# Patient Record
Sex: MALE | Race: WHITE
[De-identification: names, ages, dates, MRNs, and addresses within clinical notes are randomized per-mention and may not be internally consistent; named-entity substitution may affect disease eponyms.]

---

## 2019-02-26 ENCOUNTER — HOSPITAL ENCOUNTER (INPATIENT)
Dept: HOSPITAL 74 - YASAS | Age: 59
LOS: 4 days | Discharge: HOME | DRG: 773 | End: 2019-03-02
Attending: SURGERY | Admitting: SURGERY
Payer: COMMERCIAL

## 2019-02-26 VITALS — BODY MASS INDEX: 21.2 KG/M2

## 2019-02-26 DIAGNOSIS — F14.20: ICD-10-CM

## 2019-02-26 DIAGNOSIS — D72.819: ICD-10-CM

## 2019-02-26 DIAGNOSIS — F17.210: ICD-10-CM

## 2019-02-26 DIAGNOSIS — Z91.013: ICD-10-CM

## 2019-02-26 DIAGNOSIS — J45.909: ICD-10-CM

## 2019-02-26 DIAGNOSIS — D64.9: ICD-10-CM

## 2019-02-26 DIAGNOSIS — F11.23: Primary | ICD-10-CM

## 2019-02-26 PROCEDURE — HZ2ZZZZ DETOXIFICATION SERVICES FOR SUBSTANCE ABUSE TREATMENT: ICD-10-PCS | Performed by: NEUROMUSCULOSKELETAL MEDICINE & OMM

## 2019-02-26 RX ADMIN — GUAIFENESIN AND DEXTROMETHORPHAN PRN ML: 100; 10 SYRUP ORAL at 20:28

## 2019-02-26 RX ADMIN — Medication SCH MG: at 22:15

## 2019-02-26 RX ADMIN — ACETAMINOPHEN PRN MG: 325 TABLET ORAL at 20:28

## 2019-02-26 RX ADMIN — IBUPROFEN PRN MG: 400 TABLET, FILM COATED ORAL at 22:15

## 2019-02-26 NOTE — HP
COWS





- Scale


Resting Pulse: 0= ND 80 or Below


Sweatin= Chills/Flushing


Restless Observation: 1= Difficult to Sit Still


Pupil Size: 0= Normal to Room Light


Bone or Joint Aches: 4=Acute Joint/Muscle Pain


Runny Nose/ Eye Tearin= Runny Nose/Eyes


GI Upset > 30mins: 2= Nausea/Diarrhea


Tremor Observation: 0= None


Yawning Observation: 0= None


Anxiety or Irritability: 2=Irritable/Anxious


Goose Flesh Skin: 0=Smooth Skin


COWS Score: 12





CIWA Score





- Admission Criteria


OASAS Guidelines: Admission for Medically Managed Detox: 


Requires at least one of the followin. CIWA greater than 12


2. Seizures within the past 24 hours


3. Delirium tremens within the past 24 hours


4. Hallucinations within the past 24 hours


5. Acute intervention needed for co  occurring medical disorder


6. Acute intervention needed for co  occurring psychiatric disorder


7. Severe withdrawal that cannot be handled at a lower level of care (continued


    vomiting, continued diarrhea, abnormal vital signs) requiring intravenous


    medication and/or fluids


8. Pregnancy








Admission ROS [facility]





- [facility]


Allergies/Adverse Reactions: 


 Allergies











Allergy/AdvReac Type Severity Reaction Status Date / Time


 


seafood Allergy Severe Hives Uncoded 19 12:47


 


NKDA Allergy   Uncoded 19 12:47











History of Present Illness: 





patient here reports heroin use  1.5  bundles/ day x  19  months , prior  to 

which he did not use heroin . Reports IVDU  in [facility]  , needles from the 

exchange, denies  sharing, or re-using , denies abscess, denies OD  . latest 

use yesterday  1  am  , current symptoms as above  . This is the pt's first tx 

episode since relapse .  Reports  he was using  heroin since age 28 , through  

[id number]  , sober  until  2017 states was busy  with work  . 


cocaine  :  100 $/day  IVDU since same time as heroin  


fentanyl : reports  1-2 bags  not daily  


bup - denies 


not in [facility]  


etoh : denies  


occasional cannabis  


denies  other  illicits 


tobacco  : 3 cigs/ day since age 28 . 


PMHX : HIV  dx   ( RF =ST )  , goes to [facility], latest visit  

9 mo ago, admits to non- compliance w. HAART asthma since childhood  ( NH/ NI  

) has Albuterol  inhaler  prn  latest used yesterday  , GERD dx  6 mo ago  per 

pt ,  forgot name of meds, did not bring any , did not followup . 


PSHx : denies 


PSych : denies 


 meds : HIV does not take  


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 


Exam Limitations: No Limitations





- Ebola screening


Have you traveled outside of the country in the last 21 days: No


Have you had contact with anyone from an Ebola affected area: No


Have you been sick,other than usual withdrawal symptoms: No





- Review of Systems


Constitutional: See HPI


EENT: reports: See HPI, Other (reading glasses)


Respiratory: reports: Cough (x 3 weeks , dry,  non- productive)


: reports: See HPI


Musculoskeletal: reports: See HPI


Integumentary: reports: See HPI


Neuro: reports: No Symptoms reported


Endocrine: reports: No Symptoms Reported


Psychiatric: reports: Orientated x3, Agitated, Anxious





Patient History





- Smoking Cessation


Smoking history: Current every day smoker


Have you smoked in the past 12 months: Yes


Aproximately how many cigarettes per day: 3


Hx Chewing Tobacco Use: No


Initiated information on smoking cessation: No





- Substances Abused


  ** Heroin


Route: Injection


Frequency: Daily


Amount used: 15 bags


Age of first use: 28


Date of Last Use: 19





  ** Cocaine


Route: Injection


Frequency: Daily


Amount used: $100


Age of first use: 28


Date of Last Use: 19





Family Disease History





- Family Disease History


Family History: Denies





Admission Physical Exam BHS





- Vital Signs


Vital Signs: 


 Vital Signs - 24 hr











  19





  11:58


 


Temperature 98.1 F


 


Pulse Rate 79


 


Respiratory 17





Rate 


 


Blood Pressure 98/61














- Physical


General Appearance: Yes: Disheveled, Moderate Distress, Thin, Sweating, Anxious


HEENTM: Yes: EOMI, Normocephalic, Normal Voice, Nasal Congestion, Rhinorrhea, 

Muffled/Hoarse Voice


Respiratory: Yes: Chest Non-Tender, Lungs Clear, Decreased Breath Sounds, No 

Accessory Muscle Use


Neck: Yes: No masses,lesions,Nodules, Trachea in good position


Cardiology: Yes: Regular Rhythm, Regular Rate, S1, S2


Abdominal: Yes: Normal Bowel Sounds, Non Tender, Flat, Soft


Genitourinary: Yes: Within Normal Limits


Back: Yes: Normal Inspection


Musculoskeletal: Yes: full range of Motion, Gait Steady


Extremities: Yes: Normal Capillary Refill, Normal Range of Motion, Non-Tender


Neurological: Yes: Fully Oriented, Alert, Motor Strength 5/5


Integumentary: Yes: Track Marks, Other (marks [facility] / LE  from skin- popping)





- Diagnostic


(1) Opioid dependence


Current Visit: Yes   Status: Acute   


Qualifiers: 


   Substance use status: in withdrawal   Qualified Code(s): F11.23 - Opioid 

dependence with withdrawal   





(2) Cocaine dependence


Current Visit: Yes   Status: Chronic   


Qualifiers: 


   Substance use status: uncomplicated   Qualified Code(s): F14.20 - Cocaine 

dependence, uncomplicated   





(3) Nicotine dependence


Current Visit: Yes   Status: Chronic   


Qualifiers: 


   Nicotine product type: cigarettes 





(4) Asthma


Current Visit: Yes   Status: Chronic   


Qualifiers: 


   Asthma severity: unspecified severity 





BHS Breath Alcohol Content


Breath Alcohol Content: 0





Urine Drug Screen





- Results


Drug Screen Negative: No


Urine Drug Screen Results: BENJAMIN-Cocaine, OPI-Opiates, FEN-Fentanyl, BUP-Suboxone





Inpatient Rehab Admission





- Rehab Decision to Admit


Inpatient rehab admission?: No

## 2019-02-27 LAB
ALBUMIN SERPL-MCNC: 2.8 G/DL (ref 3.4–5)
ALP SERPL-CCNC: 104 U/L (ref 45–117)
ALT SERPL-CCNC: 84 U/L (ref 13–61)
ANION GAP SERPL CALC-SCNC: 5 MMOL/L (ref 8–16)
AST SERPL-CCNC: 73 U/L (ref 15–37)
BILIRUB SERPL-MCNC: 0.3 MG/DL (ref 0.2–1)
BUN SERPL-MCNC: 11 MG/DL (ref 7–18)
CALCIUM SERPL-MCNC: 7.5 MG/DL (ref 8.5–10.1)
CHLORIDE SERPL-SCNC: 101 MMOL/L (ref 98–107)
CO2 SERPL-SCNC: 29 MMOL/L (ref 21–32)
CREAT SERPL-MCNC: 0.7 MG/DL (ref 0.55–1.3)
DEPRECATED RDW RBC AUTO: 16.4 % (ref 11.9–15.9)
GLUCOSE SERPL-MCNC: 118 MG/DL (ref 74–106)
HCT VFR BLD CALC: 32.2 % (ref 35.4–49)
HGB BLD-MCNC: 10.8 GM/DL (ref 11.7–16.9)
MCH RBC QN AUTO: 28.9 PG (ref 25.7–33.7)
MCHC RBC AUTO-ENTMCNC: 33.7 G/DL (ref 32–35.9)
MCV RBC: 85.9 FL (ref 80–96)
PLATELET # BLD AUTO: 157 K/MM3 (ref 134–434)
PMV BLD: 11.1 FL (ref 7.5–11.1)
POTASSIUM SERPLBLD-SCNC: 4.3 MMOL/L (ref 3.5–5.1)
PROT SERPL-MCNC: 7.8 G/DL (ref 6.4–8.2)
RBC # BLD AUTO: 3.74 M/MM3 (ref 4–5.6)
SODIUM SERPL-SCNC: 134 MMOL/L (ref 136–145)
WBC # BLD AUTO: 5.9 K/MM3 (ref 4–10)

## 2019-02-27 RX ADMIN — ALBUTEROL SULFATE PRN AMP: 2.5 SOLUTION RESPIRATORY (INHALATION) at 21:34

## 2019-02-27 RX ADMIN — Medication PRN MG: at 11:52

## 2019-02-27 RX ADMIN — Medication SCH TAB: at 10:08

## 2019-02-27 RX ADMIN — Medication SCH MG: at 22:21

## 2019-02-27 RX ADMIN — ACETAMINOPHEN PRN MG: 325 TABLET ORAL at 22:20

## 2019-02-27 RX ADMIN — GUAIFENESIN AND DEXTROMETHORPHAN PRN ML: 100; 10 SYRUP ORAL at 11:52

## 2019-02-27 RX ADMIN — ALBUTEROL SULFATE PRN AMP: 2.5 SOLUTION RESPIRATORY (INHALATION) at 14:54

## 2019-02-27 NOTE — PN
BHS COWS





- Scale


Resting Pulse: 0= [place] 80 or Below


Sweatin= Chills/Flushing


Restless Observation: 0= Sits Still


Pupil Size: 0= Normal to Room Light


Bone or Joint Aches: 2= Severe Diffuse Aches


Runny Nose/ Eye Tearin= None


GI Upset > 30mins: 2= Nausea/Diarrhea


Tremor Observation of Outstretched Hands: 2= Slight Tremor Visible


Yawning Observation: 1= 1-2x During Session


Anxiety or Irritability: 2=Irritable/Anxious


Goose Flesh Skin: 3=Piloerection


COWS Score: 13





BHS Progress Note (SOAP)


Subjective: 





Interrupted Sleep, Tremors, Nausea, H/A, Hot / Cold Sensations, Sweating, Poor 

Appetite, Tremors, Constipation.


Objective: 


PATIENT A & O X 3, OBSERVED AMBULATING ON UNIT. IN NO ACUTE DISTRESS.





19 16:38


 Vital Signs











Temperature  98.6 F   19 13:12


 


Pulse Rate  75   19 13:12


 


Respiratory Rate  18   19 13:12


 


Blood Pressure  114/64   19 13:12


 


O2 Sat by Pulse Oximetry (%)      








 Laboratory Tests











  19





  05:45 05:45 05:45


 


WBC  5.9  


 


RBC  3.74 L  


 


Hgb  10.8 L  


 


Hct  32.2 L  


 


MCV  85.9  


 


MCH  28.9  


 


MCHC  33.7  


 


RDW  16.4 H  


 


Plt Count  157  


 


MPV  11.1  


 


Sodium   134 L 


 


Potassium   4.3 


 


Chloride   101 


 


Carbon Dioxide   29 


 


Anion Gap   5 L 


 


BUN   11 


 


Creatinine   0.7 


 


Creat Clearance w eGFR   > 60 


 


Random Glucose   118 H 


 


Calcium   7.5 L 


 


Total Bilirubin   0.3 


 


AST   73 H 


 


ALT   84 H 


 


Alkaline Phosphatase   104 


 


Total Protein   7.8 


 


Albumin   2.8 L 


 


RPR Titer    Nonreactive








LABS NOTED.


Assessment: 





19 16:38


WITHDRAWAL SYMPTOMS.


ANEMIA.





19 16:39





Plan: 





CONTINUE DETOX.


INCREASE DAILY PO FLUID INTAKE.


TIGAN PO P[RN FOR NAUSEA.


ENSURE, BID FOR POOR APPETITE.


PATIENT IS CURRENTLY RECEIVING DAILY MVI CONTAINING B VITAMINS AND IRON WHILE 

ADMITTED FOR DETOX.


REPEAT CBC TOMORROW AM.

## 2019-02-28 LAB
ANISOCYTOSIS BLD QL: (no result)
BASOPHILS # BLD: 0.7 % (ref 0–2)
DEPRECATED RDW RBC AUTO: 16.6 % (ref 11.9–15.9)
EOSINOPHIL # BLD: 6 % (ref 0–4.5)
HCT VFR BLD CALC: 33.9 % (ref 35.4–49)
HGB BLD-MCNC: 11.3 GM/DL (ref 11.7–16.9)
LYMPHOCYTES # BLD: 16 % (ref 8–40)
MACROCYTES BLD QL: (no result)
MCH RBC QN AUTO: 28.6 PG (ref 25.7–33.7)
MCHC RBC AUTO-ENTMCNC: 33.2 G/DL (ref 32–35.9)
MCV RBC: 86.2 FL (ref 80–96)
MONOCYTES # BLD AUTO: 16.7 % (ref 3.8–10.2)
NEUTROPHILS # BLD: 60.6 % (ref 42.8–82.8)
OVALOCYTES BLD QL SMEAR: (no result)
PLATELET # BLD AUTO: 181 K/MM3 (ref 134–434)
PLATELET BLD QL SMEAR: NORMAL
PMV BLD: 10.3 FL (ref 7.5–11.1)
RBC # BLD AUTO: 3.94 M/MM3 (ref 4–5.6)
WBC # BLD AUTO: 3.7 K/MM3 (ref 4–10)

## 2019-02-28 RX ADMIN — Medication SCH MG: at 22:32

## 2019-02-28 RX ADMIN — ALBUTEROL SULFATE PRN INHALER: 90 AEROSOL, METERED RESPIRATORY (INHALATION) at 10:52

## 2019-02-28 RX ADMIN — NICOTINE SCH MG: 14 PATCH, EXTENDED RELEASE TRANSDERMAL at 11:29

## 2019-02-28 RX ADMIN — Medication PRN MG: at 10:50

## 2019-02-28 RX ADMIN — GUAIFENESIN AND DEXTROMETHORPHAN PRN ML: 100; 10 SYRUP ORAL at 19:14

## 2019-02-28 RX ADMIN — Medication SCH TAB: at 10:50

## 2019-02-28 RX ADMIN — ALBUTEROL SULFATE PRN INHALER: 90 AEROSOL, METERED RESPIRATORY (INHALATION) at 22:32

## 2019-02-28 NOTE — PN
BHS COWS





- Scale


Resting Pulse: 1= RI 


Sweatin= Chills/Flushing


Restless Observation: 1= Difficult to Sit Still


Pupil Size: 0= Normal to Room Light


Bone or Joint Aches: 2= Severe Diffuse Aches


Runny Nose/ Eye Tearin= Nasal Congestion


GI Upset > 30mins: 0= None


Tremor Observation of Outstretched Hands: 2= Slight Tremor Visible


Yawning Observation: 1= 1-2x During Session


Anxiety or Irritability: 2=Irritable/Anxious


Goose Flesh Skin: 0=Smooth Skin


COWS Score: 11





BHS Progress Note (SOAP)


Subjective: 





Interrupted Sleep, Tremors, Nausea, Fatigue, Sweating, Poor Appetite, Tremors, 

Constipation.


Objective: 


PATIENT A & O X 3, OBSERVED AMBULATING ON UNIT. IN NO ACUTE DISTRESS.


PATIENT REPORTS INTERMITTENT DYSPNEA. PATIENT HAS HISTORY OF ASTHMA.


LUNG SOUNDS AUSCULTATED CLEAR AND EQUAL BILATERALLY.





19 15:10


 Vital Signs











Temperature  97.4 F L  19 13:14


 


Pulse Rate  83   19 13:14


 


Respiratory Rate  18   19 13:14


 


Blood Pressure  108/70   19 13:14


 


O2 Sat by Pulse Oximetry (%)      








 Laboratory Tests











  19





  05:45 05:45 05:45


 


WBC  5.9  


 


RBC  3.74 L  


 


Hgb  10.8 L  


 


Hct  32.2 L  


 


MCV  85.9  


 


MCH  28.9  


 


MCHC  33.7  


 


RDW  16.4 H  


 


Plt Count  157  


 


MPV  11.1  


 


Absolute Neuts (auto)   


 


Neutrophils %   


 


Neutrophils % (Manual)   


 


Band Neutrophils %   


 


Lymphocytes %   


 


Lymphocytes % (Manual)   


 


Monocytes %   


 


Monocytes % (Manual)   


 


Eosinophils %   


 


Eosinophils % (Manual)   


 


Basophils %   


 


Basophils % (Manual)   


 


Myelocytes % (Man)   


 


Promyelocytes % (Man)   


 


Blast Cells % (Manual)   


 


Nucleated RBC %   


 


Metamyelocytes   


 


Hypochromia   


 


Platelet Estimate   


 


Polychromasia   


 


Poikilocytosis   


 


Anisocytosis   


 


Microcytosis   


 


Macrocytosis   


 


Ovalocytes   


 


Schistocytes   


 


Sodium   134 L 


 


Potassium   4.3 


 


Chloride   101 


 


Carbon Dioxide   29 


 


Anion Gap   5 L 


 


BUN   11 


 


Creatinine   0.7 


 


Creat Clearance w eGFR   > 60 


 


Random Glucose   118 H 


 


Calcium   7.5 L 


 


Total Bilirubin   0.3 


 


AST   73 H 


 


ALT   84 H 


 


Alkaline Phosphatase   104 


 


Total Protein   7.8 


 


Albumin   2.8 L 


 


RPR Titer    Nonreactive














  19





  07:00


 


WBC  3.7 L


 


RBC  3.94 L


 


Hgb  11.3 L


 


Hct  33.9 L


 


MCV  86.2


 


MCH  28.6


 


MCHC  33.2


 


RDW  16.6 H


 


Plt Count  181


 


MPV  10.3


 


Absolute Neuts (auto)  2.2


 


Neutrophils %  60.6


 


Neutrophils % (Manual)  67.4


 


Band Neutrophils %  1.0


 


Lymphocytes %  16.0


 


Lymphocytes % (Manual)  14.7


 


Monocytes %  16.7 H


 


Monocytes % (Manual)  4


 


Eosinophils %  6.0 H


 


Eosinophils % (Manual)  11.6 H


 


Basophils %  0.7


 


Basophils % (Manual)  0.0


 


Myelocytes % (Man)  0


 


Promyelocytes % (Man)  0


 


Blast Cells % (Manual)  0


 


Nucleated RBC %  0


 


Metamyelocytes  0


 


Hypochromia  0


 


Platelet Estimate  Normal


 


Polychromasia  0


 


Poikilocytosis  0


 


Anisocytosis  1+


 


Microcytosis  1+


 


Macrocytosis  1+


 


Ovalocytes  1+


 


Schistocytes  1+


 


Sodium 


 


Potassium 


 


Chloride 


 


Carbon Dioxide 


 


Anion Gap 


 


BUN 


 


Creatinine 


 


Creat Clearance w eGFR 


 


Random Glucose 


 


Calcium 


 


Total Bilirubin 


 


AST 


 


ALT 


 


Alkaline Phosphatase 


 


Total Protein 


 


Albumin 


 


RPR Titer 








LABS NOTED.


RESULTS OF REPEAT CBC NOTED.





19 15:12





Assessment: 





19 15:12


WITHDRAWAL SYMPTOMS.


ANEMIA.


LEUKOPENIA.


Plan: 





CONTINUE DETOX.


INCREASE DAILY PO FLUID INTAKE.


PRN VENTOLIN INHALER / PRN NEBULIZER TREATMENTS FOR SOB.


PRN TIGAN PO FOR NAUSEA.


PATIENT IS CURRENTLY RECEIVING DAILY MVI CONTAINING B VITAMINS AND IRON WHILE 

ADMITTED FOR DETOX.


WILL REPEAT CBC ON 2019 TO SEE IF ANY FURTHER CHANGE OCCURS.

## 2019-03-01 RX ADMIN — GUAIFENESIN AND DEXTROMETHORPHAN PRN ML: 100; 10 SYRUP ORAL at 05:44

## 2019-03-01 RX ADMIN — Medication SCH MG: at 22:33

## 2019-03-01 RX ADMIN — Medication SCH TAB: at 10:21

## 2019-03-01 RX ADMIN — IBUPROFEN PRN MG: 400 TABLET, FILM COATED ORAL at 07:29

## 2019-03-01 RX ADMIN — GUAIFENESIN AND DEXTROMETHORPHAN PRN ML: 100; 10 SYRUP ORAL at 22:33

## 2019-03-01 RX ADMIN — NICOTINE SCH MG: 14 PATCH, EXTENDED RELEASE TRANSDERMAL at 10:21

## 2019-03-01 NOTE — PN
BHS Progress Note (SOAP)


Subjective: 





alert,irritable,anxious,interrupted sleep,pain in the body and back


Objective: 





03/01/19 09:52


 Vital Signs











Temperature  98.2 F   03/01/19 09:23


 


Pulse Rate  85   03/01/19 09:23


 


Respiratory Rate  20   03/01/19 09:23


 


Blood Pressure  119/57 L  03/01/19 09:23


 


O2 Sat by Pulse Oximetry (%)      











Assessment: 





03/01/19 09:52


withdrawal symptom


Plan: 





continue detox

## 2019-03-02 VITALS — SYSTOLIC BLOOD PRESSURE: 103 MMHG | HEART RATE: 92 BPM | DIASTOLIC BLOOD PRESSURE: 67 MMHG | TEMPERATURE: 99 F

## 2019-03-02 LAB
ANISOCYTOSIS BLD QL: (no result)
BASOPHILS # BLD: 1.5 % (ref 0–2)
DEPRECATED RDW RBC AUTO: 16.3 % (ref 11.9–15.9)
EOSINOPHIL # BLD: 5.2 % (ref 0–4.5)
HCT VFR BLD CALC: 31.2 % (ref 35.4–49)
HGB BLD-MCNC: 10.1 GM/DL (ref 11.7–16.9)
LYMPHOCYTES # BLD: 11.1 % (ref 8–40)
MACROCYTES BLD QL: (no result)
MCH RBC QN AUTO: 27.7 PG (ref 25.7–33.7)
MCHC RBC AUTO-ENTMCNC: 32.4 G/DL (ref 32–35.9)
MCV RBC: 85.5 FL (ref 80–96)
MONOCYTES # BLD AUTO: 15 % (ref 3.8–10.2)
NEUTROPHILS # BLD: 67.2 % (ref 42.8–82.8)
PLATELET # BLD AUTO: 211 K/MM3 (ref 134–434)
PLATELET BLD QL SMEAR: NORMAL
PMV BLD: 10 FL (ref 7.5–11.1)
RBC # BLD AUTO: 3.65 M/MM3 (ref 4–5.6)
WBC # BLD AUTO: 4.7 K/MM3 (ref 4–10)

## 2019-03-02 RX ADMIN — NICOTINE SCH MG: 14 PATCH, EXTENDED RELEASE TRANSDERMAL at 10:33

## 2019-03-02 RX ADMIN — Medication SCH TAB: at 10:34

## 2019-03-02 NOTE — DS
BHS Detox Discharge Summary


Admission Date: 


02/26/19





Discharge Date: 03/02/19





- History


Additional Comments: 





Pt for d/c


Had last dose of detox regimen today


Not in distress


Albuterol and Narcan prescriptions sent to [place] pharmacy, pt verbalized 

understanding to pick  up prior to leaving.


States he is strong and will not relapse as he had been clean for 27 yrs and 

that this was just a slip.


Pt states he will do outpt rehab upon discharge as he is going back to work





- Physical Exam Results


Vital Signs: 


 Vital Signs











Temperature  99 F   03/02/19 09:28


 


Pulse Rate  92 H  03/02/19 09:28


 


Respiratory Rate  18   03/02/19 09:28


 


Blood Pressure  103/67   03/02/19 09:28


 


O2 Sat by Pulse Oximetry (%)      











Pertinent Admission Physical Exam Findings: 





withdrawal sx





- Treatment


Hospital Course: Detox Protocol Followed, Detoxed Safely, Responded well, 

Discharged Condition Good





- Medication


Discharge Medications: 


Ambulatory Orders





Albuterol Sulfate Inhaler - [Ventolin HFA Inhaler -] 2 inh PO Q4H PRN #1 

inhaler 03/02/19 


Naloxone HCl [Narcan] 4 mg NS PRN #1 spray 03/02/19 











- AMA


Did Patient Leave Against Medical Advice: No

## 2019-03-02 NOTE — PN
BHS Progress Note (SOAP)


Subjective: 





Denies any complaints


Requesting D/c


Objective: 





03/02/19 10:50


A & O x 3


gait steady


Not   in  distress








 Vital Signs











Temperature  99 F   03/02/19 09:28


 


Pulse Rate  92 H  03/02/19 09:28


 


Respiratory Rate  18   03/02/19 09:28


 


Blood Pressure  103/67   03/02/19 09:28


 


O2 Sat by Pulse Oximetry (%)      

















Assessment: 





03/02/19 10:50


detox completed


Plan: 





For d/c

## 2019-03-25 ENCOUNTER — HOSPITAL ENCOUNTER (INPATIENT)
Dept: HOSPITAL 74 - YASAS | Age: 59
LOS: 4 days | Discharge: HOME | DRG: 773 | End: 2019-03-29
Attending: SURGERY | Admitting: SURGERY
Payer: COMMERCIAL

## 2019-03-25 VITALS — BODY MASS INDEX: 19.8 KG/M2

## 2019-03-25 DIAGNOSIS — E83.51: ICD-10-CM

## 2019-03-25 DIAGNOSIS — F14.20: ICD-10-CM

## 2019-03-25 DIAGNOSIS — Z21: ICD-10-CM

## 2019-03-25 DIAGNOSIS — F11.23: Primary | ICD-10-CM

## 2019-03-25 DIAGNOSIS — F10.230: ICD-10-CM

## 2019-03-25 DIAGNOSIS — F13.230: ICD-10-CM

## 2019-03-25 DIAGNOSIS — Z91.013: ICD-10-CM

## 2019-03-25 DIAGNOSIS — F41.9: ICD-10-CM

## 2019-03-25 DIAGNOSIS — F17.210: ICD-10-CM

## 2019-03-25 DIAGNOSIS — Z87.09: ICD-10-CM

## 2019-03-25 DIAGNOSIS — Z99.89: ICD-10-CM

## 2019-03-25 DIAGNOSIS — R26.89: ICD-10-CM

## 2019-03-25 PROCEDURE — HZ2ZZZZ DETOXIFICATION SERVICES FOR SUBSTANCE ABUSE TREATMENT: ICD-10-PCS | Performed by: SURGERY

## 2019-03-25 RX ADMIN — Medication SCH MG: at 22:31

## 2019-03-25 RX ADMIN — Medication PRN MG: at 22:30

## 2019-03-25 NOTE — HP
COWS





- Scale


Resting Pulse: 0= [place] 80 or Below


Sweatin= Chills/Flushing


Restless Observation: 1= Difficult to Sit Still


Pupil Size: 1= Pupils >than Normal


Bone or Joint Aches: 1= Mild Discomfort


Runny Nose/ Eye Tearin= Runny Nose/Eyes


GI Upset > 30mins: 2= Nausea/Diarrhea


Tremor Observation: 1= Tremor Felt, Not Seen


Yawning Observation: 1= 1-2x During Session


Anxiety or Irritability: 2=Irritable/Anxious


Goose Flesh Skin: 0=Smooth Skin


COWS Score: 12





CIWA Score


Nausea/Vomiting: 3


Muscle Tremors: 2


Anxiety: 3


Agitation: 0-Normal Activity


Paroxysmal Sweats: 2


Orientation: 1-Uncertain about Date


Tacttile Disturbances: 0-None


Auditory Disturbances: 0-None


Visual Disturbances: 2-Mild Sensitivity


Headache: 0-None Present


CIWA-Ar Total Score: 13





- Admission Criteria


OASAS Guidelines: Admission for Medically Managed Detox: 


Requires at least one of the followin. CIWA greater than 12


2. Seizures within the past 24 hours


3. Delirium tremens within the past 24 hours


4. Hallucinations within the past 24 hours


5. Acute intervention needed for co  occurring medical disorder


6. Acute intervention needed for co  occurring psychiatric disorder


7. Severe withdrawal that cannot be handled at a lower level of care (continued


    vomiting, continued diarrhea, abnormal vital signs) requiring intravenous


    medication and/or fluids


8. Pregnancy





Patient presents the following: CIWA greater than 12, Acute intervention needed 

for co-occurring med or psych disorder


Admission Criteria Met: Admission criteria met





Admission ROS [facility]


Chief Complaint: 





" I want to get clean, I am tire of this"


Allergies/Adverse Reactions: 


 Allergies











Allergy/AdvReac Type Severity Reaction Status Date / Time


 


No Known Drug Allergies Allergy   Verified 19 16:06


 


seafood Allergy Severe Hives Uncoded 19 12:47


 


NKDA Allergy   Uncoded 19 12:47











History of Present Illness: 





Patient is a 57 yo male with heroin (IV), cocaine (IV), alcohol, xanax 

dependence is here seeking detox. Patient reports occasional use of street 

suboxone to prevent withdrawal. Patient reports he want to [facility] for detox 

on 3/18/19 and on that same day was sent to the ED for suspected pneumonia and 

was discharge prior to completing detox treatment. PMHX: HIV, asthma and 

anxiety.  Patient reports poor compliance with medical care and medications d/t 

substance use disorder. Denies suicidal/ homicidal ideation. Denies hx of 

suicide attempt. Denies hx of overdose, blackouts or seizures. Longest period 

of sobriety 15 years, reports relapse eight months ago.


Exam Limitations: No Limitations





- Ebola screening


Have you traveled outside of the country in the last 21 days: No


Have you had contact with anyone from an Ebola affected area: No


Have you been sick,other than usual withdrawal symptoms: No





- Review of Systems


Constitutional: Chills, Loss of Appetite, Changes in sleep, Weakness, 

Unintentional Wgt. Loss


EENT: reports: Tearing, Nose Congestion


Respiratory: reports: No Symptoms reported


Cardiac: reports: Lightheadedness


GI: reports: Constipated (last BM yesterday), Nausea, Poor Appetite, Poor Fluid 

Intake, Abdominal cramping


: reports: No Symptoms Reported


Musculoskeletal: reports: Back Pain, Joint Pain


Integumentary: reports: Dryness


Neuro: reports: Dizziness


Endocrine: reports: Increased Thirst


Hematology: reports: No Symptoms Reported


Psychiatric: reports: Orientated x3, Anxious


Other Systems: Reviewed and Negative





Patient History





- Patient Medical History


Hx Anemia: No


Hx Asthma: Yes


Hx Chronic Obstructive Pulmonary Disease (COPD): No


Hx Cancer: No


Hx Cardiac Disorders: No


Hx Congestive Heart Failure: No


Hx Hypertension: No


Hx Hypercholesterolemia: No


Hx Pacemaker: No


HX Cerebrovascular Accident: No


Hx Seizures: No


Hx Dementia: No


Hx Diabetes: No


Hx Gastrointestinal Disorders: No


Hx Liver Disease: Yes (Hep C and treated six months ago )


Hx Genitourinary Disorders: No


Hx Sexually Transmitted Disorders: No


Hx Renal Disease (ESRD): No


Hx Thyroid Disease: No


Hx Human Immunodeficiency Virus (HIV): Yes ( (no meds))


Hx Hepatitis C: Yes (treated six months ago )


Hx Depression: No


Hx Suicide Attempt: No


Hx Bipolar Disorder: No


Hx Schizophrenia: No





- Patient Surgical History


Past Surgical History: No


Hx Neurologic Surgery: No


Hx Cataract Extraction: No


Hx Cardiac Surgery: No


Hx Lung Surgery: No


Hx Breast Surgery: No


Hx Breast Biopsy: No


Hx Abdominal Surgery: No


Hx Appendectomy: No


Hx Cholecystectomy: No


Hx Genitourinary Surgery: No


Hx  Section: No


Hx Orthopedic Surgery: No


Anesthesia Reaction: No





- PPD History


Previous Implant?: No


Documented Results: Negative w/o proof


PPD to be Administered?: Yes





- Smoking Cessation


Smoking history: Current every day smoker


Have you smoked in the past 12 months: Yes


Aproximately how many cigarettes per day: 5


Hx Chewing Tobacco Use: No


Initiated information on smoking cessation: Yes


'Breaking Loose' booklet given: 19





- Substance & Tx. History


Hx Alcohol Use: Yes


Hx Substance Use: Yes


Substance Use Type: Alcohol, Cocaine, Heroin, Opiates, Tranquilizers


Hx Substance Use Treatment: Yes ([facility] 3/18/19 Did not complete )





- Substances Abused


  ** cocaine


Route: Injection


Frequency: Daily


Amount used: 5 - 6 bags 


Age of first use: 27


Date of Last Use: 19





  ** heroin


Route: Injection


Frequency: Daily


Amount used: 5 - 6


Age of first use: 27


Date of Last Use: 19





  ** alcohol


Route: Oral


Frequency: 3-6 times per week


Amount used: 1 1/2 pint 


Age of first use: 18


Date of Last Use: 19





  ** Alprazolam (Xanax)


Route: Oral


Frequency: Daily


Amount used: 3 mg 


Age of first use: 58


Date of Last Use: 19





  ** Buprenorphine


Route: Injection


Frequency: 1-3 times last 30 days


Amount used: 2 mg


Age of first use: 58


Date of Last Use: 19





  ** Street Buprenorphine


Route: Injection


Frequency: 1-3 times last 30 days


Amount used: 2 mg


Age of first use: 58


Date of Last Use: 19





Family Disease History





- Family Disease History


Family History: Denies





Admission Physical Exam BHS





- Vital Signs


Vital Signs: 


 Vital Signs - 24 hr











  19





  15:13


 


Temperature 98.6 F


 


Pulse Rate 79


 


Respiratory 18





Rate 


 


Blood Pressure 95/64














- Physical


General Appearance: Yes: Disheveled, Mild Distress, Cachetic, Sweating, Anxious


HEENTM: Yes: EOMI, Hearing grossly Normal, Normal ENT Inspection, Normocephalic

, Normal Voice, JELANI, Pharynx Normal, Tm's normal, Other (cheilitis, poor 

dentition)


Respiratory: Yes: Chest Non-Tender, Lungs Clear, Normal Breath Sounds, No 

Respiratory Distress, No Accessory Muscle Use


Neck: Yes: Within Normal Limits


Breast: Yes: Breast Exam Deferred


Cardiology: Yes: Regular Rhythm, Regular Rate


Abdominal: Yes: Normal Bowel Sounds, Non Tender, Flat, Soft


Genitourinary: Yes: Within Normal Limits


Back: Yes: Normal Inspection


Musculoskeletal: Yes: full range of Motion, Gait Steady, Pelvis Stable


Extremities: Yes: Normal Capillary Refill, Normal Inspection, Normal Range of 

Motion, Non-Tender


Neurological: Yes: CNs II-XII NML intact, Fully Oriented, Alert, Motor Strength 

5/5, Depressed Affect


Integumentary: Yes: Normal Color, Warm, Diaphoresis, Track Marks (bilateral 

forearms, no infection present)


Lymphatic: Yes: Within Normal Limits





- Diagnostic


(1) Human immunodeficiency virus (HIV) disease


Current Visit: Yes   Status: Acute   





(2) Alcohol dependence with withdrawal


Current Visit: Yes   Status: Acute   





(3) Sedative, hypnotic or anxiolytic dependence with withdrawal, uncomplicated


Current Visit: Yes   Status: Acute   





(4) IVDU (intravenous drug user)


Current Visit: Yes   Status: Acute   





(5) Opioid dependence with withdrawal


Current Visit: Yes   Status: Acute   





(6) Use of cane as ambulatory aid


Current Visit: Yes   Status: Chronic   





(7) Asthma


Current Visit: Yes   Status: Chronic   


Qualifiers: 


   Asthma severity: unspecified severity 





(8) Cocaine dependence


Current Visit: Yes   Status: Chronic   


Qualifiers: 


   Substance use status: uncomplicated   Qualified Code(s): F14.20 - Cocaine 

dependence, uncomplicated   





(9) Nicotine dependence


Current Visit: Yes   Status: Chronic   


Qualifiers: 


   Nicotine product type: cigarettes 





Cleared for Admission [facility]





- Detox or Rehab


[facility] Level of Care: Medically Managed


Detox Regimen/Protocol: Methadone/Librium





BHS Breath Alcohol Content


Breath Alcohol Content: 0.010





Urine Drug Screen





- Results


Drug Screen Negative: No


Urine Drug Screen Results: BENJAMIN-Cocaine, OPI-Opiates, BZO-Benzodiazepines, MTD-

Methadone, FEN-Fentanyl, BUP-Suboxone





Inpatient Rehab Admission





- Rehab Decision to Admit


Inpatient rehab admission?: No

## 2019-03-26 LAB
ALBUMIN SERPL-MCNC: 2.5 G/DL (ref 3.4–5)
ALP SERPL-CCNC: 104 U/L (ref 45–117)
ALT SERPL-CCNC: 46 U/L (ref 13–61)
ANION GAP SERPL CALC-SCNC: 6 MMOL/L (ref 8–16)
AST SERPL-CCNC: 49 U/L (ref 15–37)
BILIRUB SERPL-MCNC: 0.2 MG/DL (ref 0.2–1)
BUN SERPL-MCNC: 11 MG/DL (ref 7–18)
CALCIUM SERPL-MCNC: 7.7 MG/DL (ref 8.5–10.1)
CHLORIDE SERPL-SCNC: 101 MMOL/L (ref 98–107)
CO2 SERPL-SCNC: 29 MMOL/L (ref 21–32)
CREAT SERPL-MCNC: 0.7 MG/DL (ref 0.55–1.3)
DEPRECATED RDW RBC AUTO: 16.5 % (ref 11.9–15.9)
GLUCOSE SERPL-MCNC: 72 MG/DL (ref 74–106)
HCT VFR BLD CALC: 30.2 % (ref 35.4–49)
HGB BLD-MCNC: 9.9 GM/DL (ref 11.7–16.9)
MCH RBC QN AUTO: 27.4 PG (ref 25.7–33.7)
MCHC RBC AUTO-ENTMCNC: 32.8 G/DL (ref 32–35.9)
MCV RBC: 83.5 FL (ref 80–96)
PLATELET # BLD AUTO: 188 K/MM3 (ref 134–434)
PMV BLD: 10.7 FL (ref 7.5–11.1)
POTASSIUM SERPLBLD-SCNC: 4.4 MMOL/L (ref 3.5–5.1)
PROT SERPL-MCNC: 7.3 G/DL (ref 6.4–8.2)
RBC # BLD AUTO: 3.62 M/MM3 (ref 4–5.6)
SODIUM SERPL-SCNC: 136 MMOL/L (ref 136–145)
WBC # BLD AUTO: 4.6 K/MM3 (ref 4–10)

## 2019-03-26 RX ADMIN — Medication SCH MG: at 22:04

## 2019-03-26 RX ADMIN — Medication SCH TAB: at 10:04

## 2019-03-26 RX ADMIN — NICOTINE SCH MG: 14 PATCH, EXTENDED RELEASE TRANSDERMAL at 10:04

## 2019-03-26 RX ADMIN — Medication PRN MG: at 22:04

## 2019-03-26 RX ADMIN — ACETAMINOPHEN PRN MG: 325 TABLET ORAL at 16:35

## 2019-03-26 NOTE — EKG
Test Reason : 

Blood Pressure : ***/*** mmHG

Vent. Rate : 075 BPM     Atrial Rate : 075 BPM

   P-R Int : 144 ms          QRS Dur : 082 ms

    QT Int : 378 ms       P-R-T Axes : 071 047 071 degrees

   QTc Int : 422 ms

 

NORMAL SINUS RHYTHM

NORMAL ECG

NO PREVIOUS ECGS AVAILABLE

Confirmed by JEROMY GARCÍA MD ([id number]) on 3/26/2019 9:29:07 AM

 

Referred By:             Confirmed By:JEROMY GARCÍA MD

## 2019-03-26 NOTE — PN
[facility] CIWA





- CIWA Score


Nausea/Vomitin-No Nausea/No Vomiting


Muscle Tremors: 2


Anxiety: 1-Mildly Anxious


Agitation: 2


Paroxysmal Sweats: 1-Minimal Palms Moist


Orientation: 2-Disoriented Date<2 days


Tacttile Disturbances: 0-None


Auditory Disturbances: 0-None


Visual Disturbances: 0-None


Headache: 0-None Present


CIWA-Ar Total Score: 8





S COWS





- Scale


Resting Pulse: 0= [place] 80 or Below


Sweatin= Chills/Flushing


Restless Observation: 0= Sits Still


Pupil Size: 0= Normal to Room Light


Bone or Joint Aches: 1= Mild Discomfort


Runny Nose/ Eye Tearin= Nasal Congestion


GI Upset > 30mins: 1= Stomach Cramp


Tremor Observation of Outstretched Hands: 1= Tremor Felt, Not Seen


Yawning Observation: 1= 1-2x During Session


Anxiety or Irritability: 1=Feels Anxious/Irritable


Goose Flesh Skin: 0=Smooth Skin


COWS Score: 7





[facility] Progress Note (SOAP)


Subjective: 





anxious not able to sleep through the night restlessness





Objective: 





19 10:08


 Vital Signs











Temperature  96.6 F L  19 09:10


 


Pulse Rate  76   19 09:10


 


Respiratory Rate  16   19 09:10


 


Blood Pressure  88/56 L  19 09:10


 


O2 Sat by Pulse Oximetry (%)      











19 10:10


lab pending


Assessment: 





19 10:12


withdrawal sx


Plan: 





continue detox

## 2019-03-26 NOTE — CONSULT
BHS Psychiatric Consult





- Data


Date of interview: 03/26/19


Admission source: [facility]


Identifying data: Readmission to [facility] for this 59 y/o  male self-

referred for detoxification (cocaine, heroin,xanax, alcohol). Examined on [address]. Patient is , no children, domiciled, unemployed (disabled) and 

supported on SSD benefits.


Substance Abuse History: Confirmed by patient in this session. Details in 

current BHS report : Smoking history: Current every day smoker.  Have you 

smoked in the past 12 months: Yes.  Aproximately how many cigarettes per day: 

5.  Hx Chewing Tobacco Use: No.  Initiated information on smoking cessation: 

Yes.  'Breaking Loose' booklet given: 03/25/19.  - Substance & Tx. History.  Hx 

Alcohol Use: Yes.  Hx Substance Use: Yes.  Substance Use Type: Alcohol, Cocaine

, Heroin, Opiates, Tranquilizers.  Hx Substance Use Treatment: Yes ([facility] 3/

18/19 Did not complete ).  - Substances Abused.  ** cocaine.  Route: Injection.

  Frequency: Daily.  Amount used: 5 - 6 bags.  Age of first use: 27.  Date of 

Last Use: 03/24/19.  ** heroin.  Route: Injection.  Frequency: Daily.  Amount 

used: 5 - 6.  Age of first use: 27.  Date of Last Use: 03/24/19.  ** alcohol.  

Route: Oral.  Frequency: 3-6 times per week.  Amount used: 1 1/2 pint.  Age of 

first use: 18.  Date of Last Use: 03/24/19.  ** Alprazolam (Xanax).  Route: 

Oral.  Frequency: Daily.  Amount used: 3 mg.  Age of first use: 58.  Date of 

Last Use: 03/24/19.  ** Buprenorphine.  Route: Injection.  Frequency: 1-3 times 

last 30 days.  Amount used: 2 mg.  Age of first use: 58.  Date of Last Use: 03/ 24/19.  ** Street Buprenorphine.  Route: Injection.  Frequency: 1-3 times last 

30 days.  Amount used: 2 mg.  Age of first use: 58.  Date of Last Use: 03/24/19


Medical History: Remarkable for HIV infection since 1984, hepatitis B + C and 

right leg injury (wounded in combat in [place]). Patient ambulates with a cane.


Psychiatric History: Patient denies.


Physical/Sexual Abuse/Trauma History: Patient denies history of abuse. 

Reminisces about his war experiences during his tour of duty in [facility] [facility] 

(served as a  ). Discharged from the  in 1987.


Additional Comment: Urine Drug Screen Results: BENJAMIN-Cocaine, OPI-Opiates, BZO-

Benzodiazepines, MTD-Methadone, FEN-Fentanyl, BUP-Suboxone. Noted.





Mental Status Exam





- Mental Status Exam


Alert and Oriented to: Time, Place, Person


Cognitive Function: Good


Patient Appearance: Well Groomed


Mood: Withdrawn, Hopeful


Affect: Appropriate, Normal Range


Patient Behavior: Fatigued, Cooperative


Speech Pattern: Clear, Appropriate


Voice Loudness: Normal


Thought Process: Intact, Goal Oriented


Thought Disorder: Not Present


Hallucinations: Denies


Suicidal Ideation: Denies


Homicidal Ideation: Denies


Insight/Judgement: Poor


Sleep: Well


Appetite: Good


Muscle strength/Tone: Normal


Gait/Station: Other (walks with a cane)





Psychiatric Findings





- Problem List (Axis 1, 2,3)


(1) Alcohol dependence with withdrawal


Current Visit: Yes   Status: Acute   





(2) Opioid dependence with withdrawal


Current Visit: Yes   Status: Acute   





(3) Sedative, hypnotic or anxiolytic dependence with withdrawal, uncomplicated


Current Visit: Yes   Status: Acute   





(4) Cocaine dependence


Current Visit: Yes   Status: Chronic   


Qualifiers: 


   Substance use status: uncomplicated   Qualified Code(s): F14.20 - Cocaine 

dependence, uncomplicated   





(5) Nicotine dependence


Current Visit: Yes   Status: Chronic   


Qualifiers: 


   Nicotine product type: cigarettes 





- Initial Treatment Plan


Initial Treatment Plan: Psychoeducation. Sleep hygiene. Detoxification. 

Support. AA/NA meetings. Relapse prevention discussed with the patient. 

Observation.

## 2019-03-27 RX ADMIN — NICOTINE SCH MG: 14 PATCH, EXTENDED RELEASE TRANSDERMAL at 10:09

## 2019-03-27 RX ADMIN — CALCIUM CARBONATE-CHOLECALCIFEROL TAB 250 MG-125 UNIT SCH TAB: 250-125 TAB at 22:21

## 2019-03-27 RX ADMIN — ACETAMINOPHEN PRN MG: 325 TABLET ORAL at 20:09

## 2019-03-27 RX ADMIN — Medication SCH TAB: at 10:09

## 2019-03-27 RX ADMIN — Medication PRN MG: at 22:22

## 2019-03-27 RX ADMIN — CALCIUM CARBONATE-CHOLECALCIFEROL TAB 250 MG-125 UNIT SCH TAB: 250-125 TAB at 11:48

## 2019-03-27 RX ADMIN — Medication SCH MG: at 22:21

## 2019-03-27 NOTE — PN
[facility] CIWA





- CIWA Score


Nausea/Vomitin-No Nausea/No Vomiting


Muscle Tremors: 1-None Visible, but Felt


Anxiety: 1-Mildly Anxious


Agitation: 1-Slight > Activity


Paroxysmal Sweats: 1-Minimal Palms Moist


Orientation: 0-Oriented


Tacttile Disturbances: 0-None


Auditory Disturbances: 0-None


Visual Disturbances: 0-None


Headache: 0-None Present


CIWA-Ar Total Score: 4





BHS COWS





- Scale


Resting Pulse: 1= DC 


Sweatin= Chills/Flushing


Restless Observation: 0= Sits Still


Pupil Size: 0= Normal to Room Light


Bone or Joint Aches: 1= Mild Discomfort


Runny Nose/ Eye Tearin= Nasal Congestion


GI Upset > 30mins: 0= None


Tremor Observation of Outstretched Hands: 0= None


Yawning Observation: 0= None


Anxiety or Irritability: 0= None


Goose Flesh Skin: 0=Smooth Skin


COWS Score: 4





BHS Progress Note (SOAP)


Subjective: 





feeling ok today ambulate with cane on hallway tolerate food and fluid better 

today


Objective: 





19 10:14


 Vital Signs











Temperature  98.6 F   19 09:30


 


Pulse Rate  89   19 09:30


 


Respiratory Rate  18   19 09:30


 


Blood Pressure  91/54 L  19 09:30


 


O2 Sat by Pulse Oximetry (%)      








 Laboratory Last Values











WBC  4.6 K/mm3 (4.0-10.0)   19  07:30    


 


RBC  3.62 M/mm3 (4.00-5.60)  L  19  07:30    


 


Hgb  9.9 GM/dL (11.7-16.9)  L  19  07:30    


 


Hct  30.2 % (35.4-49)  L  19  07:30    


 


MCV  83.5 fl (80-96)   19  07:30    


 


MCH  27.4 pg (25.7-33.7)   19  07:30    


 


MCHC  32.8 g/dl (32.0-35.9)   19  07:30    


 


RDW  16.5 % (11.9-15.9)  H  19  07:30    


 


Plt Count  188 K/MM3 (134-434)   19  07:30    


 


MPV  10.7 fl (7.5-11.1)   19  07:30    


 


Sodium  136 mmol/L (136-145)   19  07:30    


 


Potassium  4.4 mmol/L (3.5-5.1)   19  07:30    


 


Chloride  101 mmol/L ()   19  07:30    


 


Carbon Dioxide  29 mmol/L (21-32)   19  07:30    


 


Anion Gap  6 MMOL/L (8-16)  L  19  07:30    


 


BUN  11 mg/dL (7-18)   19  07:30    


 


Creatinine  0.7 mg/dL (0.55-1.3)   19  07:30    


 


Creat Clearance w eGFR  116.24  (>60)   19  07:30    


 


Random Glucose  72 mg/dL ()  L  19  07:30    


 


Calcium  7.7 mg/dL (8.5-10.1)  L  19  07:30    


 


Total Bilirubin  0.2 mg/dL (0.2-1)   19  07:30    


 


AST  49 U/L (15-37)  H  19  07:30    


 


ALT  46 U/L (13-61)   19  07:30    


 


Alkaline Phosphatase  104 U/L ()   19  07:30    


 


Total Protein  7.3 g/dl (6.4-8.2)   19  07:30    


 


Albumin  2.5 g/dl (3.4-5.0)  L  19  07:30    


 


RPR Titer  Nonreactive  (NONREACTIVE)   19  07:30    








lab noted


low Ca++


Assessment: 





19 10:15


withdrawal sx


19 10:15


low calcium


Plan: 





continue detox


oscal bid

## 2019-03-28 RX ADMIN — NICOTINE SCH MG: 14 PATCH, EXTENDED RELEASE TRANSDERMAL at 11:34

## 2019-03-28 RX ADMIN — CALCIUM CARBONATE-CHOLECALCIFEROL TAB 250 MG-125 UNIT SCH TAB: 250-125 TAB at 22:18

## 2019-03-28 RX ADMIN — Medication SCH TAB: at 11:33

## 2019-03-28 RX ADMIN — Medication SCH MG: at 22:18

## 2019-03-28 RX ADMIN — Medication PRN MG: at 22:18

## 2019-03-28 RX ADMIN — CALCIUM CARBONATE-CHOLECALCIFEROL TAB 250 MG-125 UNIT SCH TAB: 250-125 TAB at 11:33

## 2019-03-28 RX ADMIN — ACETAMINOPHEN PRN MG: 325 TABLET ORAL at 01:45

## 2019-03-28 NOTE — PN
[facility] CIWA





- CIWA Score


Nausea/Vomitin-No Nausea/No Vomiting


Muscle Tremors: 1-None Visible, but Felt


Anxiety: 0-No Anxiety, at Ease


Agitation: 0-Normal Activity


Paroxysmal Sweats: No Perspiration


Orientation: 0-Oriented


Tacttile Disturbances: 0-None


Auditory Disturbances: 0-None


Visual Disturbances: 0-None


Headache: 0-None Present


CIWA-Ar Total Score: 1





[facility] COWS





- Scale


Resting Pulse: 0= MT 80 or Below


Sweatin= No chills or Flushing


Restless Observation: 0= Sits Still


Pupil Size: 0= Normal to Room Light


Bone or Joint Aches: 0= None


Runny Nose/ Eye Tearin= None


GI Upset > 30mins: 0= None


Tremor Observation of Outstretched Hands: 1= Tremor Felt, Not Seen


Yawning Observation: 1= 1-2x During Session


Anxiety or Irritability: 0= None


Goose Flesh Skin: 0=Smooth Skin


COWS Score: 2





BHS Progress Note (SOAP)


Subjective: 





feeling better bp remain low encourage oral fluid


Objective: 





19 16:05


 Vital Signs











Temperature  96.8 F L  19 06:37


 


Pulse Rate  62   19 06:37


 


Respiratory Rate  18   19 06:37


 


Blood Pressure  89/49 L  19 06:37


 


O2 Sat by Pulse Oximetry (%)      








 Laboratory Last Values











WBC  4.6 K/mm3 (4.0-10.0)   19  07:30    


 


RBC  3.62 M/mm3 (4.00-5.60)  L  19  07:30    


 


Hgb  9.9 GM/dL (11.7-16.9)  L  19  07:30    


 


Hct  30.2 % (35.4-49)  L  19  07:30    


 


MCV  83.5 fl (80-96)   19  07:30    


 


MCH  27.4 pg (25.7-33.7)   19  07:30    


 


MCHC  32.8 g/dl (32.0-35.9)   19  07:30    


 


RDW  16.5 % (11.9-15.9)  H  19  07:30    


 


Plt Count  188 K/MM3 (134-434)   19  07:30    


 


MPV  10.7 fl (7.5-11.1)   19  07:30    


 


Sodium  136 mmol/L (136-145)   19  07:30    


 


Potassium  4.4 mmol/L (3.5-5.1)   19  07:30    


 


Chloride  101 mmol/L ()   19  07:30    


 


Carbon Dioxide  29 mmol/L (21-32)   19  07:30    


 


Anion Gap  6 MMOL/L (8-16)  L  19  07:30    


 


BUN  11 mg/dL (7-18)   19  07:30    


 


Creatinine  0.7 mg/dL (0.55-1.3)   19  07:30    


 


Creat Clearance w eGFR  116.24  (>60)   19  07:30    


 


Random Glucose  72 mg/dL ()  L  19  07:30    


 


Calcium  7.7 mg/dL (8.5-10.1)  L  19  07:30    


 


Total Bilirubin  0.2 mg/dL (0.2-1)   19  07:30    


 


AST  49 U/L (15-37)  H  19  07:30    


 


ALT  46 U/L (13-61)   19  07:30    


 


Alkaline Phosphatase  104 U/L ()   19  07:30    


 


Total Protein  7.3 g/dl (6.4-8.2)   19  07:30    


 


Albumin  2.5 g/dl (3.4-5.0)  L  19  07:30    


 


RPR Titer  Nonreactive  (NONREACTIVE)   19  07:30    








lab noted 


19 16:06


low ca++


continue supplement


Assessment: 





19 16:06


withdrawal sx


Plan: 





continue detox

## 2019-03-29 VITALS — HEART RATE: 78 BPM | DIASTOLIC BLOOD PRESSURE: 45 MMHG | SYSTOLIC BLOOD PRESSURE: 80 MMHG | TEMPERATURE: 96.9 F

## 2019-03-29 RX ADMIN — ACETAMINOPHEN PRN MG: 325 TABLET ORAL at 01:20

## 2019-03-29 NOTE — DS
[facility]HS Detox Discharge Summary


Admission Date: 


03/25/19





Discharge Date: 03/29/19





- History


Present History: Alcohol Dependence, Cocaine Dependence, Opioid Dependence, 

Sedative Dependence


Additional Comments: 





PATIENT GOING TO [facility] ([place], [place]) FOR AFTERCARE. PATIENT WAS 

DISCHARGED FROM DETOX UNIT IN STABLE MEDICAL CONDITION.


Pertinent Past History: 


Asthma, Hep C (Treated), H.I.V., Nicotine Dependence, Use Of Cane AS Ambulatory 

Aid, I.V.D.U. (Intravenous Drug User).





- Physical Exam Results


Vital Signs: 


 Vital Signs











Temperature  96.9 F L  03/29/19 07:33


 


Pulse Rate  78   03/29/19 07:33


 


Respiratory Rate  16   03/29/19 07:33


 


Blood Pressure  80/45 L  03/29/19 07:33


 


O2 Sat by Pulse Oximetry (%)      











Pertinent Admission Physical Exam Findings: 





WITHDRAWAL SYMPTOMS.





 Laboratory Tests











  03/26/19 03/26/19 03/26/19





  07:30 07:30 07:30


 


WBC  4.6  


 


RBC  3.62 L  


 


Hgb  9.9 L  


 


Hct  30.2 L  


 


MCV  83.5  


 


MCH  27.4  


 


MCHC  32.8  


 


RDW  16.5 H  


 


Plt Count  188  


 


MPV  10.7  


 


Sodium   136 


 


Potassium   4.4 


 


Chloride   101 


 


Carbon Dioxide   29 


 


Anion Gap   6 L 


 


BUN   11 


 


Creatinine   0.7 


 


Creat Clearance w eGFR   116.24 


 


Random Glucose   72 L 


 


Calcium   7.7 L 


 


Total Bilirubin   0.2 


 


AST   49 H 


 


ALT   46 


 


Alkaline Phosphatase   104 


 


Total Protein   7.3 


 


Albumin   2.5 L 


 


RPR Titer    Nonreactive








LABS NOTED.





- Treatment


Hospital Course: Detox Protocol Followed, Detoxed Safely, Responded well, 

Discharged Condition Good, Rehab Referral Accepted


Patient has Accepted a Rehab Referral to: [facility] ([place], [place]).





- Medication


Discharge Medications: 


Ambulatory Orders





Albuterol Sulfate Inhaler - [Ventolin HFA Inhaler -] 2 inh PO Q4H PRN #1 

inhaler 03/28/19 


Albuterol Sulfate Inhaler - [Ventolin HFA Inhaler -] 2 puff IH Q4H PRN #1 

inhaler 03/28/19 











- Diagnosis


(1) Alcohol dependence with withdrawal


Status: Acute   


Qualifiers: 


   Complication of substance-induced condition: uncomplicated   Qualified Code(s

): F10.230 - Alcohol dependence with withdrawal, uncomplicated   





(2) Human immunodeficiency virus (HIV) disease


Status: Chronic   





(3) IVDU (intravenous drug user)


Status: Acute   





(4) Opioid dependence with withdrawal


Status: Acute   





(5) Sedative, hypnotic or anxiolytic dependence with withdrawal, uncomplicated


Status: Acute   





(6) Asthma


Status: Chronic   


Qualifiers: 


   Asthma severity: unspecified severity   Asthma persistence: unspecified   

Asthma complication type: uncomplicated   Qualified Code(s): J45.909 - 

Unspecified asthma, uncomplicated   





(7) Cocaine dependence


Status: Chronic   


Qualifiers: 


   Substance use status: uncomplicated   Qualified Code(s): F14.20 - Cocaine 

dependence, uncomplicated   





(8) Nicotine dependence


Status: Chronic   


Qualifiers: 


   Nicotine product type: cigarettes   Substance use status: uncomplicated   

Qualified Code(s): F17.210 - Nicotine dependence, cigarettes, uncomplicated   





(9) Use of cane as ambulatory aid


Status: Chronic   





- AMA


Did Patient Leave Against Medical Advice: No

## 2019-06-24 ENCOUNTER — HOSPITAL ENCOUNTER (INPATIENT)
Dept: HOSPITAL 74 - YASAS | Age: 59
LOS: 3 days | Discharge: OTHER INSTITUTION | End: 2019-06-27
Payer: COMMERCIAL

## 2019-06-27 ENCOUNTER — HOSPITAL ENCOUNTER (INPATIENT)
Dept: HOSPITAL 74 - YASAS | Age: 59
LOS: 13 days | Discharge: HOME | DRG: 772 | End: 2019-07-10
Attending: NEUROMUSCULOSKELETAL MEDICINE & OMM | Admitting: NEUROMUSCULOSKELETAL MEDICINE & OMM
Payer: COMMERCIAL

## 2019-06-27 DIAGNOSIS — Z79.84: ICD-10-CM

## 2019-06-27 DIAGNOSIS — B20: ICD-10-CM

## 2019-06-27 DIAGNOSIS — F43.10: ICD-10-CM

## 2019-06-27 DIAGNOSIS — B18.2: ICD-10-CM

## 2019-06-27 DIAGNOSIS — F10.20: Primary | ICD-10-CM

## 2019-06-27 DIAGNOSIS — F19.24: ICD-10-CM

## 2019-06-27 DIAGNOSIS — J45.909: ICD-10-CM

## 2019-06-27 DIAGNOSIS — Z99.89: ICD-10-CM

## 2019-06-27 DIAGNOSIS — R63.4: ICD-10-CM

## 2019-06-27 DIAGNOSIS — F19.282: ICD-10-CM

## 2019-06-27 DIAGNOSIS — E11.9: ICD-10-CM

## 2019-06-27 DIAGNOSIS — B37.0: ICD-10-CM

## 2019-06-27 DIAGNOSIS — F14.20: ICD-10-CM

## 2019-06-27 DIAGNOSIS — F17.210: ICD-10-CM

## 2019-06-27 PROCEDURE — HZ42ZZZ GROUP COUNSELING FOR SUBSTANCE ABUSE TREATMENT, COGNITIVE-BEHAVIORAL: ICD-10-PCS | Performed by: NEUROMUSCULOSKELETAL MEDICINE & OMM

## 2019-06-27 RX ADMIN — RALTEGRAVIR SCH MG: 400 TABLET, FILM COATED ORAL at 21:51

## 2019-06-27 RX ADMIN — Medication PRN MG: at 21:51

## 2019-06-27 RX ADMIN — FERROUS SULFATE TAB EC 324 MG (65 MG FE EQUIVALENT) SCH MG: 324 (65 FE) TABLET DELAYED RESPONSE at 17:03

## 2019-06-27 RX ADMIN — Medication SCH MG: at 21:50

## 2019-06-27 NOTE — HP
B[facility] MD Rehab Assess/Revision





- Admission History


[facility]


Date of Admission to Rehab: 06/27/19





- Findings


Detox History & Physical reviewed: Yes


Concur with findings: Yes


Comments/Additional Findings: for rehab as protocl.continue head injry 

observation





Inpatient Rehab Admission





- Rehab Decision to Admit


Inpatient rehab admission?: Yes





- Initial Determination


Are CD services needed?: Yes


Free of communicable disease: Yes


Not in need of hospitalization: Yes





- Rehab Admission Criteria


Previous failed treatment: Yes


Poor recovery environment: Yes


Comorbidities: Yes


Lacks judgement: No


Patient is meeting Inpatient Rehab admission criteria:: Yes

## 2019-06-28 RX ADMIN — RALTEGRAVIR SCH MG: 400 TABLET, FILM COATED ORAL at 10:11

## 2019-06-28 RX ADMIN — FERROUS SULFATE TAB EC 324 MG (65 MG FE EQUIVALENT) SCH MG: 324 (65 FE) TABLET DELAYED RESPONSE at 12:00

## 2019-06-28 RX ADMIN — IBUPROFEN PRN MG: 400 TABLET, FILM COATED ORAL at 21:52

## 2019-06-28 RX ADMIN — SULFAMETHOXAZOLE AND TRIMETHOPRIM SCH EACH: 800; 160 TABLET ORAL at 10:11

## 2019-06-28 RX ADMIN — RALTEGRAVIR SCH MG: 400 TABLET, FILM COATED ORAL at 21:51

## 2019-06-28 RX ADMIN — FERROUS SULFATE TAB EC 324 MG (65 MG FE EQUIVALENT) SCH MG: 324 (65 FE) TABLET DELAYED RESPONSE at 07:07

## 2019-06-28 RX ADMIN — RITONAVIR SCH MG: 100 TABLET, FILM COATED ORAL at 10:11

## 2019-06-28 RX ADMIN — EMTRICITABINE AND TENOFOVIR DISOPROXIL FUMARATE SCH TAB: 200; 300 TABLET, FILM COATED ORAL at 10:11

## 2019-06-28 RX ADMIN — FLUCONAZOLE SCH MG: 100 TABLET ORAL at 10:11

## 2019-06-28 RX ADMIN — FERROUS SULFATE TAB EC 324 MG (65 MG FE EQUIVALENT) SCH MG: 324 (65 FE) TABLET DELAYED RESPONSE at 17:56

## 2019-06-28 RX ADMIN — Medication SCH MG: at 21:51

## 2019-06-28 RX ADMIN — Medication PRN MG: at 21:52

## 2019-06-28 RX ADMIN — Medication SCH TAB: at 10:11

## 2019-06-29 RX ADMIN — Medication SCH TAB: at 10:21

## 2019-06-29 RX ADMIN — Medication PRN MG: at 21:49

## 2019-06-29 RX ADMIN — RALTEGRAVIR SCH MG: 400 TABLET, FILM COATED ORAL at 21:48

## 2019-06-29 RX ADMIN — Medication SCH MG: at 21:48

## 2019-06-29 RX ADMIN — RITONAVIR SCH MG: 100 TABLET, FILM COATED ORAL at 10:21

## 2019-06-29 RX ADMIN — RALTEGRAVIR SCH MG: 400 TABLET, FILM COATED ORAL at 10:21

## 2019-06-29 RX ADMIN — FERROUS SULFATE TAB EC 324 MG (65 MG FE EQUIVALENT) SCH: 324 (65 FE) TABLET DELAYED RESPONSE at 14:18

## 2019-06-29 RX ADMIN — FLUCONAZOLE SCH MG: 100 TABLET ORAL at 10:21

## 2019-06-29 RX ADMIN — FERROUS SULFATE TAB EC 324 MG (65 MG FE EQUIVALENT) SCH MG: 324 (65 FE) TABLET DELAYED RESPONSE at 07:43

## 2019-06-29 RX ADMIN — SULFAMETHOXAZOLE AND TRIMETHOPRIM SCH EACH: 800; 160 TABLET ORAL at 10:21

## 2019-06-29 RX ADMIN — EMTRICITABINE AND TENOFOVIR DISOPROXIL FUMARATE SCH TAB: 200; 300 TABLET, FILM COATED ORAL at 10:21

## 2019-06-29 RX ADMIN — FERROUS SULFATE TAB EC 324 MG (65 MG FE EQUIVALENT) SCH MG: 324 (65 FE) TABLET DELAYED RESPONSE at 18:05

## 2019-06-30 RX ADMIN — HYDROXYZINE PAMOATE PRN MG: 25 CAPSULE ORAL at 10:29

## 2019-06-30 RX ADMIN — RITONAVIR SCH MG: 100 TABLET, FILM COATED ORAL at 10:25

## 2019-06-30 RX ADMIN — RALTEGRAVIR SCH MG: 400 TABLET, FILM COATED ORAL at 21:41

## 2019-06-30 RX ADMIN — FLUCONAZOLE SCH MG: 100 TABLET ORAL at 10:25

## 2019-06-30 RX ADMIN — EMTRICITABINE AND TENOFOVIR DISOPROXIL FUMARATE SCH TAB: 200; 300 TABLET, FILM COATED ORAL at 10:25

## 2019-06-30 RX ADMIN — RALTEGRAVIR SCH MG: 400 TABLET, FILM COATED ORAL at 10:25

## 2019-06-30 RX ADMIN — Medication PRN MG: at 21:41

## 2019-06-30 RX ADMIN — FERROUS SULFATE TAB EC 324 MG (65 MG FE EQUIVALENT) SCH MG: 324 (65 FE) TABLET DELAYED RESPONSE at 17:27

## 2019-06-30 RX ADMIN — FERROUS SULFATE TAB EC 324 MG (65 MG FE EQUIVALENT) SCH MG: 324 (65 FE) TABLET DELAYED RESPONSE at 07:40

## 2019-06-30 RX ADMIN — Medication SCH MG: at 21:41

## 2019-06-30 RX ADMIN — FERROUS SULFATE TAB EC 324 MG (65 MG FE EQUIVALENT) SCH: 324 (65 FE) TABLET DELAYED RESPONSE at 13:15

## 2019-06-30 RX ADMIN — SULFAMETHOXAZOLE AND TRIMETHOPRIM SCH EACH: 800; 160 TABLET ORAL at 10:25

## 2019-06-30 RX ADMIN — Medication SCH TAB: at 10:25

## 2019-07-01 RX ADMIN — FERROUS SULFATE TAB EC 324 MG (65 MG FE EQUIVALENT) SCH MG: 324 (65 FE) TABLET DELAYED RESPONSE at 07:31

## 2019-07-01 RX ADMIN — Medication SCH TAB: at 10:41

## 2019-07-01 RX ADMIN — SULFAMETHOXAZOLE AND TRIMETHOPRIM SCH EACH: 800; 160 TABLET ORAL at 10:44

## 2019-07-01 RX ADMIN — FERROUS SULFATE TAB EC 324 MG (65 MG FE EQUIVALENT) SCH MG: 324 (65 FE) TABLET DELAYED RESPONSE at 17:40

## 2019-07-01 RX ADMIN — FERROUS SULFATE TAB EC 324 MG (65 MG FE EQUIVALENT) SCH MG: 324 (65 FE) TABLET DELAYED RESPONSE at 12:02

## 2019-07-01 RX ADMIN — RALTEGRAVIR SCH MG: 400 TABLET, FILM COATED ORAL at 21:44

## 2019-07-01 RX ADMIN — EMTRICITABINE AND TENOFOVIR DISOPROXIL FUMARATE SCH TAB: 200; 300 TABLET, FILM COATED ORAL at 10:41

## 2019-07-01 RX ADMIN — Medication SCH MG: at 21:44

## 2019-07-01 RX ADMIN — RITONAVIR SCH MG: 100 TABLET, FILM COATED ORAL at 10:42

## 2019-07-01 RX ADMIN — RALTEGRAVIR SCH MG: 400 TABLET, FILM COATED ORAL at 10:43

## 2019-07-01 RX ADMIN — ALBUTEROL SULFATE PRN PUFF: 90 AEROSOL, METERED RESPIRATORY (INHALATION) at 15:50

## 2019-07-01 RX ADMIN — FLUCONAZOLE SCH MG: 100 TABLET ORAL at 10:43

## 2019-07-01 RX ADMIN — Medication PRN MG: at 21:44

## 2019-07-01 RX ADMIN — HYDROXYZINE PAMOATE PRN MG: 25 CAPSULE ORAL at 10:44

## 2019-07-02 RX ADMIN — SULFAMETHOXAZOLE AND TRIMETHOPRIM SCH EACH: 800; 160 TABLET ORAL at 10:32

## 2019-07-02 RX ADMIN — RALTEGRAVIR SCH MG: 400 TABLET, FILM COATED ORAL at 10:33

## 2019-07-02 RX ADMIN — FERROUS SULFATE TAB EC 324 MG (65 MG FE EQUIVALENT) SCH MG: 324 (65 FE) TABLET DELAYED RESPONSE at 12:50

## 2019-07-02 RX ADMIN — FERROUS SULFATE TAB EC 324 MG (65 MG FE EQUIVALENT) SCH MG: 324 (65 FE) TABLET DELAYED RESPONSE at 17:48

## 2019-07-02 RX ADMIN — Medication SCH MG: at 21:42

## 2019-07-02 RX ADMIN — Medication SCH TAB: at 10:33

## 2019-07-02 RX ADMIN — FLUCONAZOLE SCH MG: 100 TABLET ORAL at 10:33

## 2019-07-02 RX ADMIN — LORATADINE SCH MG: 10 TABLET ORAL at 17:48

## 2019-07-02 RX ADMIN — RITONAVIR SCH MG: 100 TABLET, FILM COATED ORAL at 10:33

## 2019-07-02 RX ADMIN — RALTEGRAVIR SCH MG: 400 TABLET, FILM COATED ORAL at 21:42

## 2019-07-02 RX ADMIN — EMTRICITABINE AND TENOFOVIR DISOPROXIL FUMARATE SCH TAB: 200; 300 TABLET, FILM COATED ORAL at 10:32

## 2019-07-02 RX ADMIN — FERROUS SULFATE TAB EC 324 MG (65 MG FE EQUIVALENT) SCH MG: 324 (65 FE) TABLET DELAYED RESPONSE at 07:07

## 2019-07-02 RX ADMIN — Medication PRN MG: at 21:43

## 2019-07-03 RX ADMIN — EMTRICITABINE AND TENOFOVIR DISOPROXIL FUMARATE SCH TAB: 200; 300 TABLET, FILM COATED ORAL at 10:15

## 2019-07-03 RX ADMIN — FLUCONAZOLE SCH MG: 100 TABLET ORAL at 10:13

## 2019-07-03 RX ADMIN — SULFAMETHOXAZOLE AND TRIMETHOPRIM SCH EACH: 800; 160 TABLET ORAL at 10:12

## 2019-07-03 RX ADMIN — Medication PRN MG: at 21:37

## 2019-07-03 RX ADMIN — Medication SCH TAB: at 10:12

## 2019-07-03 RX ADMIN — FERROUS SULFATE TAB EC 324 MG (65 MG FE EQUIVALENT) SCH MG: 324 (65 FE) TABLET DELAYED RESPONSE at 07:03

## 2019-07-03 RX ADMIN — LORATADINE SCH MG: 10 TABLET ORAL at 10:12

## 2019-07-03 RX ADMIN — RITONAVIR SCH MG: 100 TABLET, FILM COATED ORAL at 10:14

## 2019-07-03 RX ADMIN — Medication SCH: at 21:35

## 2019-07-03 RX ADMIN — FERROUS SULFATE TAB EC 324 MG (65 MG FE EQUIVALENT) SCH MG: 324 (65 FE) TABLET DELAYED RESPONSE at 16:56

## 2019-07-03 RX ADMIN — AMITRIPTYLINE HYDROCHLORIDE SCH MG: 25 TABLET, FILM COATED ORAL at 21:36

## 2019-07-03 RX ADMIN — FERROUS SULFATE TAB EC 324 MG (65 MG FE EQUIVALENT) SCH MG: 324 (65 FE) TABLET DELAYED RESPONSE at 13:00

## 2019-07-03 RX ADMIN — RALTEGRAVIR SCH MG: 400 TABLET, FILM COATED ORAL at 21:35

## 2019-07-03 RX ADMIN — RALTEGRAVIR SCH MG: 400 TABLET, FILM COATED ORAL at 10:13

## 2019-07-03 NOTE — CONSULT
[facility] Psychiatric Consult





- Data


Date of interview: 07/03/19


Admission source: [facility]


Identifying data: Mr George is a 58 years old   male, 

unemployed receiving SSD, domiciled seeking detox treatment for alcohol, opioid 

and cocaine


Substance Abuse History: reports history of alcohol, heroin and cocaine use. 

refer to addiction counselor's summary for further information


Medical History: Significant for HIV infection since 1984, anemia, bronchial 

asthma, type 2 diabetes mellitus, history of treatment for hepatitis C and 

right leg injury (wounded in combat in [facility]). Patient ambulates with a cane. 

Smokes 5 cigarettes daily


Psychiatric History: Reports that his first psychiatric contact was in 1985 

after he was discharged from the . He saw a psychiatrist at the 

[facility] and was diagnosed with PTSD. He was prescribed Elavil which he 

took for 3 years. Reports receiving psychiatric treatment in the early 90's 

while in halfway. Reports that he was prescribed Elavil again. He served 2 years 

in halfway and after his release, he went back to the [facility] for outpatient 

psychiatric treatment for one year. Reports that he has not received 

psychiatric treatment since. Denies psychiatric hiospitalization or suicidal 

attempt. At present, reports feeling sad and sleeping poorly


Physical/Sexual Abuse/Trauma History: Patient denies history of any type of  

abuse as well as DV relationship. Reminisces about his war experiences during 

his tour of duty in [facility] (served as a  ). Discharged from 

the  in 1987.


Additional Comment: Reports history of 2 previous felony arrests/convictions on 

charges of assault. Reports serving 2 years in halfway for each





Mental Status Exam





- Mental Status Exam


Alert and Oriented to: Time, Place, Person


Cognitive Function: Fair


Patient Appearance: Well Groomed


Mood: Sad


Affect: Appropriate


Patient Behavior: Cooperative


Speech Pattern: Clear


Voice Loudness: Normal


Thought Process: Intact


Hallucinations: Denies


Suicidal Ideation: Denies


Insight/Judgement: Fair


Sleep: Poorly


Appetite: Good


Muscle strength/Tone: Normal


Gait/Station: Normal





Psychiatric Findings





- Problem List (Axis 1, 2,3)


(1) PTSD (post-traumatic stress disorder)


Current Visit: Yes   Status: Acute   





(2) Substance induced mood disorder


Current Visit: Yes   Status: Acute   





(3) Substance-induced sleep disorder


Current Visit: Yes   Status: Acute   





(4) Alcohol dependence


Current Visit: Yes   Status: Acute   





(5) Opioid dependence


Current Visit: No   Status: Acute   


Qualifiers: 


   Substance use status: in withdrawal   Qualified Code(s): F11.23 - Opioid 

dependence with withdrawal   





(6) Cocaine dependence


Current Visit: No   Status: Chronic   


Qualifiers: 


   Substance use status: uncomplicated   Qualified Code(s): F14.20 - Cocaine 

dependence, uncomplicated   





(7) Nicotine dependence


Current Visit: No   Status: Chronic   


Qualifiers: 


   Nicotine product type: cigarettes   Substance use status: uncomplicated   

Qualified Code(s): F17.210 - Nicotine dependence, cigarettes, uncomplicated   





(8) Anemia


Current Visit: No   Status: Chronic   





(9) DM2 (diabetes mellitus, type 2)


Current Visit: No   Status: Chronic   





(10) Hepatitis C


Current Visit: No   Status: Resolved   





(11) Asthma


Current Visit: No   Status: Chronic   


Qualifiers: 


   Asthma severity: unspecified severity   Asthma persistence: unspecified   

Asthma complication type: uncomplicated   Qualified Code(s): J45.909 - 

Unspecified asthma, uncomplicated   





(12) Human immunodeficiency virus (HIV) disease


Current Visit: No   Status: Chronic   





(13) Use of cane as ambulatory aid


Current Visit: No   Status: Chronic   





- Initial Treatment Plan


Initial Treatment Plan: 1) Start Elavil 50 mg po HS.  2) Continue inpatient 

rehabilitation

## 2019-07-04 RX ADMIN — AMITRIPTYLINE HYDROCHLORIDE SCH MG: 25 TABLET, FILM COATED ORAL at 21:23

## 2019-07-04 RX ADMIN — FERROUS SULFATE TAB EC 324 MG (65 MG FE EQUIVALENT) SCH MG: 324 (65 FE) TABLET DELAYED RESPONSE at 07:50

## 2019-07-04 RX ADMIN — RITONAVIR SCH MG: 100 TABLET, FILM COATED ORAL at 10:05

## 2019-07-04 RX ADMIN — FLUCONAZOLE SCH MG: 100 TABLET ORAL at 10:05

## 2019-07-04 RX ADMIN — RALTEGRAVIR SCH MG: 400 TABLET, FILM COATED ORAL at 21:23

## 2019-07-04 RX ADMIN — Medication SCH TAB: at 10:05

## 2019-07-04 RX ADMIN — FERROUS SULFATE TAB EC 324 MG (65 MG FE EQUIVALENT) SCH MG: 324 (65 FE) TABLET DELAYED RESPONSE at 16:55

## 2019-07-04 RX ADMIN — RALTEGRAVIR SCH MG: 400 TABLET, FILM COATED ORAL at 10:05

## 2019-07-04 RX ADMIN — Medication PRN MG: at 21:23

## 2019-07-04 RX ADMIN — FERROUS SULFATE TAB EC 324 MG (65 MG FE EQUIVALENT) SCH: 324 (65 FE) TABLET DELAYED RESPONSE at 12:18

## 2019-07-04 RX ADMIN — NICOTINE SCH MG: 21 PATCH TRANSDERMAL at 10:45

## 2019-07-04 RX ADMIN — EMTRICITABINE AND TENOFOVIR DISOPROXIL FUMARATE SCH TAB: 200; 300 TABLET, FILM COATED ORAL at 10:07

## 2019-07-04 RX ADMIN — LORATADINE SCH MG: 10 TABLET ORAL at 10:05

## 2019-07-04 RX ADMIN — SULFAMETHOXAZOLE AND TRIMETHOPRIM SCH EACH: 800; 160 TABLET ORAL at 10:05

## 2019-07-04 RX ADMIN — Medication SCH MG: at 21:23

## 2019-07-05 RX ADMIN — LORATADINE SCH MG: 10 TABLET ORAL at 10:13

## 2019-07-05 RX ADMIN — ALBUTEROL SULFATE PRN PUFF: 90 AEROSOL, METERED RESPIRATORY (INHALATION) at 14:58

## 2019-07-05 RX ADMIN — FLUCONAZOLE SCH MG: 100 TABLET ORAL at 10:13

## 2019-07-05 RX ADMIN — IBUPROFEN PRN MG: 400 TABLET, FILM COATED ORAL at 01:43

## 2019-07-05 RX ADMIN — Medication PRN MG: at 21:32

## 2019-07-05 RX ADMIN — RITONAVIR SCH MG: 100 TABLET, FILM COATED ORAL at 10:13

## 2019-07-05 RX ADMIN — FERROUS SULFATE TAB EC 324 MG (65 MG FE EQUIVALENT) SCH: 324 (65 FE) TABLET DELAYED RESPONSE at 14:38

## 2019-07-05 RX ADMIN — FERROUS SULFATE TAB EC 324 MG (65 MG FE EQUIVALENT) SCH MG: 324 (65 FE) TABLET DELAYED RESPONSE at 16:49

## 2019-07-05 RX ADMIN — FERROUS SULFATE TAB EC 324 MG (65 MG FE EQUIVALENT) SCH MG: 324 (65 FE) TABLET DELAYED RESPONSE at 07:45

## 2019-07-05 RX ADMIN — SULFAMETHOXAZOLE AND TRIMETHOPRIM SCH EACH: 800; 160 TABLET ORAL at 10:13

## 2019-07-05 RX ADMIN — NICOTINE SCH MG: 21 PATCH TRANSDERMAL at 10:15

## 2019-07-05 RX ADMIN — AMITRIPTYLINE HYDROCHLORIDE SCH MG: 25 TABLET, FILM COATED ORAL at 21:32

## 2019-07-05 RX ADMIN — Medication SCH TAB: at 10:13

## 2019-07-05 RX ADMIN — EMTRICITABINE AND TENOFOVIR DISOPROXIL FUMARATE SCH TAB: 200; 300 TABLET, FILM COATED ORAL at 10:13

## 2019-07-05 RX ADMIN — RALTEGRAVIR SCH MG: 400 TABLET, FILM COATED ORAL at 10:14

## 2019-07-05 RX ADMIN — Medication SCH MG: at 21:32

## 2019-07-05 RX ADMIN — RALTEGRAVIR SCH MG: 400 TABLET, FILM COATED ORAL at 21:31

## 2019-07-06 RX ADMIN — ALBUTEROL SULFATE PRN PUFF: 90 AEROSOL, METERED RESPIRATORY (INHALATION) at 17:45

## 2019-07-06 RX ADMIN — IBUPROFEN PRN MG: 400 TABLET, FILM COATED ORAL at 10:01

## 2019-07-06 RX ADMIN — RALTEGRAVIR SCH MG: 400 TABLET, FILM COATED ORAL at 10:00

## 2019-07-06 RX ADMIN — Medication SCH MG: at 21:49

## 2019-07-06 RX ADMIN — SULFAMETHOXAZOLE AND TRIMETHOPRIM SCH EACH: 800; 160 TABLET ORAL at 10:00

## 2019-07-06 RX ADMIN — RITONAVIR SCH MG: 100 TABLET, FILM COATED ORAL at 10:00

## 2019-07-06 RX ADMIN — FERROUS SULFATE TAB EC 324 MG (65 MG FE EQUIVALENT) SCH MG: 324 (65 FE) TABLET DELAYED RESPONSE at 17:46

## 2019-07-06 RX ADMIN — FLUCONAZOLE SCH MG: 100 TABLET ORAL at 10:00

## 2019-07-06 RX ADMIN — RALTEGRAVIR SCH MG: 400 TABLET, FILM COATED ORAL at 21:50

## 2019-07-06 RX ADMIN — Medication SCH TAB: at 10:00

## 2019-07-06 RX ADMIN — IBUPROFEN PRN MG: 400 TABLET, FILM COATED ORAL at 00:43

## 2019-07-06 RX ADMIN — FERROUS SULFATE TAB EC 324 MG (65 MG FE EQUIVALENT) SCH MG: 324 (65 FE) TABLET DELAYED RESPONSE at 12:17

## 2019-07-06 RX ADMIN — LORATADINE SCH MG: 10 TABLET ORAL at 10:00

## 2019-07-06 RX ADMIN — AMITRIPTYLINE HYDROCHLORIDE SCH MG: 25 TABLET, FILM COATED ORAL at 21:50

## 2019-07-06 RX ADMIN — EMTRICITABINE AND TENOFOVIR DISOPROXIL FUMARATE SCH TAB: 200; 300 TABLET, FILM COATED ORAL at 10:00

## 2019-07-06 RX ADMIN — NICOTINE SCH MG: 21 PATCH TRANSDERMAL at 10:00

## 2019-07-06 RX ADMIN — FERROUS SULFATE TAB EC 324 MG (65 MG FE EQUIVALENT) SCH MG: 324 (65 FE) TABLET DELAYED RESPONSE at 07:13

## 2019-07-06 RX ADMIN — Medication PRN MG: at 21:51

## 2019-07-06 RX ADMIN — HYDROXYZINE PAMOATE PRN MG: 25 CAPSULE ORAL at 00:43

## 2019-07-06 RX ADMIN — ALBUTEROL SULFATE PRN PUFF: 90 AEROSOL, METERED RESPIRATORY (INHALATION) at 12:17

## 2019-07-07 RX ADMIN — LORATADINE SCH MG: 10 TABLET ORAL at 10:12

## 2019-07-07 RX ADMIN — EMTRICITABINE AND TENOFOVIR DISOPROXIL FUMARATE SCH TAB: 200; 300 TABLET, FILM COATED ORAL at 10:12

## 2019-07-07 RX ADMIN — RITONAVIR SCH MG: 100 TABLET, FILM COATED ORAL at 10:12

## 2019-07-07 RX ADMIN — Medication SCH MG: at 21:16

## 2019-07-07 RX ADMIN — FERROUS SULFATE TAB EC 324 MG (65 MG FE EQUIVALENT) SCH MG: 324 (65 FE) TABLET DELAYED RESPONSE at 07:26

## 2019-07-07 RX ADMIN — Medication SCH TAB: at 10:12

## 2019-07-07 RX ADMIN — SULFAMETHOXAZOLE AND TRIMETHOPRIM SCH EACH: 800; 160 TABLET ORAL at 10:12

## 2019-07-07 RX ADMIN — NICOTINE SCH MG: 21 PATCH TRANSDERMAL at 10:13

## 2019-07-07 RX ADMIN — ALBUTEROL SULFATE PRN PUFF: 90 AEROSOL, METERED RESPIRATORY (INHALATION) at 16:37

## 2019-07-07 RX ADMIN — RALTEGRAVIR SCH MG: 400 TABLET, FILM COATED ORAL at 21:16

## 2019-07-07 RX ADMIN — IBUPROFEN PRN MG: 400 TABLET, FILM COATED ORAL at 10:14

## 2019-07-07 RX ADMIN — FLUCONAZOLE SCH MG: 100 TABLET ORAL at 10:12

## 2019-07-07 RX ADMIN — ALBUTEROL SULFATE PRN PUFF: 90 AEROSOL, METERED RESPIRATORY (INHALATION) at 11:44

## 2019-07-07 RX ADMIN — RALTEGRAVIR SCH MG: 400 TABLET, FILM COATED ORAL at 10:13

## 2019-07-07 RX ADMIN — MAGNESIUM HYDROXIDE PRN ML: 400 SUSPENSION ORAL at 12:50

## 2019-07-07 RX ADMIN — Medication PRN MG: at 21:16

## 2019-07-07 RX ADMIN — AMITRIPTYLINE HYDROCHLORIDE SCH MG: 25 TABLET, FILM COATED ORAL at 21:16

## 2019-07-07 RX ADMIN — FERROUS SULFATE TAB EC 324 MG (65 MG FE EQUIVALENT) SCH: 324 (65 FE) TABLET DELAYED RESPONSE at 17:34

## 2019-07-07 RX ADMIN — FERROUS SULFATE TAB EC 324 MG (65 MG FE EQUIVALENT) SCH: 324 (65 FE) TABLET DELAYED RESPONSE at 12:45

## 2019-07-08 RX ADMIN — ALBUTEROL SULFATE PRN PUFF: 90 AEROSOL, METERED RESPIRATORY (INHALATION) at 08:41

## 2019-07-08 RX ADMIN — Medication PRN MG: at 21:42

## 2019-07-08 RX ADMIN — AMITRIPTYLINE HYDROCHLORIDE SCH MG: 25 TABLET, FILM COATED ORAL at 21:42

## 2019-07-08 RX ADMIN — Medication SCH TAB: at 10:49

## 2019-07-08 RX ADMIN — NICOTINE SCH MG: 21 PATCH TRANSDERMAL at 10:51

## 2019-07-08 RX ADMIN — FERROUS SULFATE TAB EC 324 MG (65 MG FE EQUIVALENT) SCH MG: 324 (65 FE) TABLET DELAYED RESPONSE at 07:48

## 2019-07-08 RX ADMIN — LIDOCAINE SCH PATCH: 50 PATCH TOPICAL at 14:47

## 2019-07-08 RX ADMIN — SULFAMETHOXAZOLE AND TRIMETHOPRIM SCH EACH: 800; 160 TABLET ORAL at 10:49

## 2019-07-08 RX ADMIN — RITONAVIR SCH MG: 100 TABLET, FILM COATED ORAL at 10:49

## 2019-07-08 RX ADMIN — Medication SCH: at 21:41

## 2019-07-08 RX ADMIN — RALTEGRAVIR SCH MG: 400 TABLET, FILM COATED ORAL at 21:42

## 2019-07-08 RX ADMIN — MAGNESIUM HYDROXIDE PRN ML: 400 SUSPENSION ORAL at 23:05

## 2019-07-08 RX ADMIN — IBUPROFEN PRN MG: 400 TABLET, FILM COATED ORAL at 23:05

## 2019-07-08 RX ADMIN — Medication SCH MG: at 21:41

## 2019-07-08 RX ADMIN — EMTRICITABINE AND TENOFOVIR DISOPROXIL FUMARATE SCH TAB: 200; 300 TABLET, FILM COATED ORAL at 10:49

## 2019-07-08 RX ADMIN — FERROUS SULFATE TAB EC 324 MG (65 MG FE EQUIVALENT) SCH: 324 (65 FE) TABLET DELAYED RESPONSE at 17:56

## 2019-07-08 RX ADMIN — LORATADINE SCH MG: 10 TABLET ORAL at 10:49

## 2019-07-08 RX ADMIN — RALTEGRAVIR SCH MG: 400 TABLET, FILM COATED ORAL at 10:49

## 2019-07-08 RX ADMIN — FLUCONAZOLE SCH MG: 100 TABLET ORAL at 10:49

## 2019-07-08 RX ADMIN — FERROUS SULFATE TAB EC 324 MG (65 MG FE EQUIVALENT) SCH: 324 (65 FE) TABLET DELAYED RESPONSE at 12:57

## 2019-07-09 RX ADMIN — FERROUS SULFATE TAB EC 324 MG (65 MG FE EQUIVALENT) SCH: 324 (65 FE) TABLET DELAYED RESPONSE at 17:34

## 2019-07-09 RX ADMIN — FLUCONAZOLE SCH MG: 100 TABLET ORAL at 10:45

## 2019-07-09 RX ADMIN — FERROUS SULFATE TAB EC 324 MG (65 MG FE EQUIVALENT) SCH: 324 (65 FE) TABLET DELAYED RESPONSE at 14:11

## 2019-07-09 RX ADMIN — AMITRIPTYLINE HYDROCHLORIDE SCH MG: 25 TABLET, FILM COATED ORAL at 21:39

## 2019-07-09 RX ADMIN — Medication SCH MG: at 21:38

## 2019-07-09 RX ADMIN — LORATADINE SCH MG: 10 TABLET ORAL at 10:45

## 2019-07-09 RX ADMIN — RALTEGRAVIR SCH MG: 400 TABLET, FILM COATED ORAL at 10:45

## 2019-07-09 RX ADMIN — Medication SCH EACH: at 21:39

## 2019-07-09 RX ADMIN — NICOTINE SCH MG: 21 PATCH TRANSDERMAL at 10:46

## 2019-07-09 RX ADMIN — Medication PRN MG: at 21:40

## 2019-07-09 RX ADMIN — RALTEGRAVIR SCH MG: 400 TABLET, FILM COATED ORAL at 21:38

## 2019-07-09 RX ADMIN — Medication SCH TAB: at 10:45

## 2019-07-09 RX ADMIN — ALBUTEROL SULFATE PRN PUFF: 90 AEROSOL, METERED RESPIRATORY (INHALATION) at 23:24

## 2019-07-09 RX ADMIN — RITONAVIR SCH MG: 100 TABLET, FILM COATED ORAL at 10:45

## 2019-07-09 RX ADMIN — LIDOCAINE SCH PATCH: 50 PATCH TOPICAL at 10:46

## 2019-07-09 RX ADMIN — ALBUTEROL SULFATE PRN PUFF: 90 AEROSOL, METERED RESPIRATORY (INHALATION) at 14:23

## 2019-07-09 RX ADMIN — EMTRICITABINE AND TENOFOVIR DISOPROXIL FUMARATE SCH TAB: 200; 300 TABLET, FILM COATED ORAL at 10:45

## 2019-07-09 RX ADMIN — FERROUS SULFATE TAB EC 324 MG (65 MG FE EQUIVALENT) SCH MG: 324 (65 FE) TABLET DELAYED RESPONSE at 07:06

## 2019-07-09 RX ADMIN — SULFAMETHOXAZOLE AND TRIMETHOPRIM SCH EACH: 800; 160 TABLET ORAL at 10:45

## 2019-07-09 NOTE — PN
[facility]HS Progress Note


Note: 





Patient is scheduled for discharge tomorrow. Script for 30 days supply of 

Elavil 50 mg/hs will be electronically transmitted to [facility] at [address] [place], [place] [id number]

## 2019-07-10 VITALS — DIASTOLIC BLOOD PRESSURE: 61 MMHG | SYSTOLIC BLOOD PRESSURE: 103 MMHG | HEART RATE: 98 BPM | TEMPERATURE: 97.7 F

## 2019-07-10 RX ADMIN — ALBUTEROL SULFATE PRN PUFF: 90 AEROSOL, METERED RESPIRATORY (INHALATION) at 09:53

## 2019-07-10 RX ADMIN — SULFAMETHOXAZOLE AND TRIMETHOPRIM SCH EACH: 800; 160 TABLET ORAL at 09:53

## 2019-07-10 RX ADMIN — FLUCONAZOLE SCH MG: 100 TABLET ORAL at 09:52

## 2019-07-10 RX ADMIN — NICOTINE SCH MG: 21 PATCH TRANSDERMAL at 10:01

## 2019-07-10 RX ADMIN — FERROUS SULFATE TAB EC 324 MG (65 MG FE EQUIVALENT) SCH: 324 (65 FE) TABLET DELAYED RESPONSE at 07:19

## 2019-07-10 RX ADMIN — LORATADINE SCH MG: 10 TABLET ORAL at 09:53

## 2019-07-10 RX ADMIN — RITONAVIR SCH MG: 100 TABLET, FILM COATED ORAL at 09:52

## 2019-07-10 RX ADMIN — Medication SCH TAB: at 09:53

## 2019-07-10 RX ADMIN — RALTEGRAVIR SCH MG: 400 TABLET, FILM COATED ORAL at 09:52

## 2019-07-10 RX ADMIN — LIDOCAINE SCH PATCH: 50 PATCH TOPICAL at 10:01

## 2019-07-10 RX ADMIN — EMTRICITABINE AND TENOFOVIR DISOPROXIL FUMARATE SCH TAB: 200; 300 TABLET, FILM COATED ORAL at 09:53

## 2019-07-10 NOTE — PN
BHS Progress Note (SOAP)


Subjective: 





PT COMPLETED REHAB  AND DISCHARGED TODAY. PT MET WITH HIS COUNSELOR AND  HAS 

BEEN REFERRED TO [facility] AND . [facility] FOR [facility]/MEDICAL MANAGEMENT 

OF COMORBID CONDITIONS. PT IS ALERT O X 3. DENIES S/H/I. COURTESY RX AS BELOW 

ELECTRONICALLY SENT TO [facility] FOR  AFTER DISCHARGE. PT 

INSTRUCTED TO FOLLOW UP WITH HIS PRIMARY CARE FOR FURTHER MEDICAL MANAGEMENT/

RX. 


Objective: 





07/10/19 10:35


 Vital Signs - 24 hr











  07/10/19 07/10/19 07/10/19





  00:30 03:30 06:57


 


Temperature   97.7 F


 


Pulse Rate   98 H


 


Respiratory 18 18 16





Rate   


 


Blood Pressure   103/61








 Home Medications











 Medication  Instructions  Recorded


 


Albuterol Sulfate Inhaler - 2 puff IH Q4H PRN #1 inhaler 07/09/19





[Ventolin HFA Inhaler -]  


 


Amitriptyline HCl [Elavil -] 50 mg PO HS #30 tablet 07/09/19


 


Emtricitabine/Tenofovir [Truvada -] 1 tab PO DAILY #14 tablet 07/09/19


 


Ferrous Sulfate [Feosol] 325 mg PO TIDCM 14 Days  ud 07/09/19


 


Fluconazole [Diflucan -] 100 mg PO DAILY #14 tablet 07/09/19


 


Raltegravir Potassium [Isentress] 400 mg PO BID 14 Days #28 tablet 07/09/19


 


Ritonavir [Norvir -] 100 mg PO DAILY #14 tab 07/09/19


 


Sulfamethoxazole/Trimethoprim 1 tab PO DAILY #14 tablet 07/09/19





[Bactrim DS -]  











Assessment: 





07/10/19 10:35


NAD


MEDICALLY STABLE








[facility] Inpatient Services Medical





- Diagnosis


(1) Alcohol dependence


Qualifiers: 


   Substance use status: uncomplicated   Qualified Code(s): F10.20 - Alcohol 

dependence, uncomplicated   


Current Visit: Yes   Status: Chronic   





(2) Weight loss


Current Visit: Yes   Status: Chronic   





(3) Asthma


Qualifiers: 


   Asthma severity: unspecified severity   Asthma persistence: unspecified   

Asthma complication type: unspecified   Qualified Code(s): J45.909 - 

Unspecified asthma, uncomplicated   


Current Visit: Yes   Status: Chronic   





(4) Cocaine dependence


Qualifiers: 


   Substance use status: uncomplicated   Qualified Code(s): F14.20 - Cocaine 

dependence, uncomplicated   


Current Visit: Yes   Status: Chronic   





(5) DM2 (diabetes mellitus, type 2)


Qualifiers: 


   Diabetes mellitus long term insulin use: without long term use 


Current Visit: Yes   Status: Chronic   





(6) Human immunodeficiency virus (HIV) disease


Current Visit: Yes   Status: Chronic   





(7) Nicotine dependence


Qualifiers: 


   Nicotine product type: cigarettes   Substance use status: uncomplicated   

Qualified Code(s): F17.210 - Nicotine dependence, cigarettes, uncomplicated   


Current Visit: Yes   Status: Chronic   





(8) Use of cane as ambulatory aid


Current Visit: Yes   Status: Chronic   





(9) Hepatitis C


Qualifiers: 


   Viral hepatitis chronicity: unspecified 


Current Visit: Yes   Status: Resolved   





(10) Anemia


Qualifiers: 


   Anemia type: unspecified type   Qualified Code(s): D64.9 - Anemia, 

unspecified   


Current Visit: Yes   Status: Chronic   





(11) IVDU (intravenous drug user)


Current Visit: Yes   Status: Chronic   





(12) Oropharyngeal candidiasis


Current Visit: Yes   Status: Acute   





Plan: 





D/W PT TO FOLLOW UP WITH CD AFTERCARE AS RECOMMENDED.


FOLLOW UP WITH PCP WITHIN 1-2 WEEKS AFTER DISCHARGE.

## 2019-09-07 ENCOUNTER — HOSPITAL ENCOUNTER (INPATIENT)
Dept: HOSPITAL 74 - YASAS | Age: 59
LOS: 4 days | Discharge: HOME | DRG: 773 | End: 2019-09-11
Attending: SURGERY | Admitting: SURGERY
Payer: COMMERCIAL

## 2019-09-07 VITALS — BODY MASS INDEX: 20.9 KG/M2

## 2019-09-07 DIAGNOSIS — B20: ICD-10-CM

## 2019-09-07 DIAGNOSIS — J45.20: ICD-10-CM

## 2019-09-07 DIAGNOSIS — F10.230: ICD-10-CM

## 2019-09-07 DIAGNOSIS — R63.4: ICD-10-CM

## 2019-09-07 DIAGNOSIS — Z91.013: ICD-10-CM

## 2019-09-07 DIAGNOSIS — R26.2: ICD-10-CM

## 2019-09-07 DIAGNOSIS — Z99.89: ICD-10-CM

## 2019-09-07 DIAGNOSIS — F43.10: ICD-10-CM

## 2019-09-07 DIAGNOSIS — G47.00: ICD-10-CM

## 2019-09-07 DIAGNOSIS — D64.9: ICD-10-CM

## 2019-09-07 DIAGNOSIS — Z86.19: ICD-10-CM

## 2019-09-07 DIAGNOSIS — F17.213: ICD-10-CM

## 2019-09-07 DIAGNOSIS — F19.24: ICD-10-CM

## 2019-09-07 DIAGNOSIS — F14.20: ICD-10-CM

## 2019-09-07 DIAGNOSIS — F11.23: Primary | ICD-10-CM

## 2019-09-07 DIAGNOSIS — Z91.19: ICD-10-CM

## 2019-09-07 PROCEDURE — HZ2ZZZZ DETOXIFICATION SERVICES FOR SUBSTANCE ABUSE TREATMENT: ICD-10-PCS | Performed by: ALLERGY & IMMUNOLOGY

## 2019-09-07 RX ADMIN — NICOTINE SCH MG: 7 PATCH TRANSDERMAL at 14:53

## 2019-09-07 RX ADMIN — NYSTATIN SCH UNITS: 100000 SUSPENSION ORAL at 23:24

## 2019-09-07 RX ADMIN — RALTEGRAVIR SCH MG: 400 TABLET, FILM COATED ORAL at 22:39

## 2019-09-07 RX ADMIN — NYSTATIN SCH UNITS: 100000 SUSPENSION ORAL at 18:25

## 2019-09-07 RX ADMIN — Medication SCH MG: at 22:39

## 2019-09-07 NOTE — HP
COWS





- Scale


Resting Pulse: 0= [place] 80 or Below


Sweatin= Chills/Flushing


Restless Observation: 1= Difficult to Sit Still


Pupil Size: 1= Pupils >than Normal


Bone or Joint Aches: 2= Severe Diffuse Aches


Runny Nose/ Eye Tearin= Runny Nose/Eyes


GI Upset > 30mins: 2= Nausea/Diarrhea


Tremor Observation: 2= Slight Tremor Visible


Yawning Observation: 1= 1-2x During Session


Anxiety or Irritability: 1=Feels Anxious/Irritable


Goose Flesh Skin: 3=Piloerection


COWS Score: 16





CIWA Score


Nausea/Vomitin


Muscle Tremors: 2


Anxiety: 2


Agitation: 2


Paroxysmal Sweats: 2


Orientation: 0-Oriented


Tacttile Disturbances: 1-Very Mild Itch/Numbness


Auditory Disturbances: 0-None


Visual Disturbances: 2-Mild Sensitivity


Headache: 2-Mild


CIWA-Ar Total Score: 15





- Admission Criteria


[facility] Guidelines: Admission for Medically Managed Detox: 


Requires at least one of the followin. CIWA greater than 12


2. Seizures within the past 24 hours


3. Delirium tremens within the past 24 hours


4. Hallucinations within the past 24 hours


5. Acute intervention needed for co  occurring medical disorder


6. Acute intervention needed for co  occurring psychiatric disorder


7. Severe withdrawal that cannot be handled at a lower level of care (continued


    vomiting, continued diarrhea, abnormal vital signs) requiring intravenous


    medication and/or fluids


8. Pregnancy





Patient presents the following: CIWA greater than 12


Admission Criteria Met: Admission criteria met





Admission ROS [facility]


Chief Complaint: 





I need detox


Allergies/Adverse Reactions: 


 Allergies











Allergy/AdvReac Type Severity Reaction Status Date / Time


 


fish derived Allergy Severe Hives Verified 19 17:09


 


shellfish derived Allergy Severe Hives Verified 19 17:09


 


No Known Drug Allergies Allergy   Verified 19 11:42


 


seafood Allergy Severe Hives Uncoded 19 11:42


 


NKDA Allergy   Uncoded 19 11:42











History of Present Illness: 





59 year old man with illicit drug and alcohol use presents for detox. His last 

treatment was in , he denies blackouts or seizures from alcohol/drugs


Exam Limitations: No Limitations





- Ebola screening


Have you traveled outside of the country in the last 21 days: No (N)


Have you had contact with anyone from an Ebola affected area: No


Have you been sick,other than usual withdrawal symptoms: No


Do you have a fever: No





- Review of Systems


Constitutional: Chills, Loss of Appetite, Changes in sleep, Unintentional Wgt. 

Loss


EENT: reports: No Symptoms Reported


Respiratory: reports: No Symptoms reported


Cardiac: reports: No Symptoms Reported


GI: reports: Poor Appetite, Poor Fluid Intake, Abdominal cramping


Musculoskeletal: reports: Back Pain, Joint Pain, Muscle Weakness


Integumentary: reports: Lesions, Pruritus, Rash


Neuro: reports: Headache, Numbness, Unsteady Gait


Endocrine: reports: No Symptoms Reported


Hematology: reports: Anemia


Psychiatric: reports: No Sypmtoms Reported


Other Systems: Reviewed and Negative





Patient History





- Patient Medical History


Hx Anemia: Yes (on iron)


Hx Asthma: Yes


Hx Chronic Obstructive Pulmonary Disease (COPD): No


Hx Cancer: No


Hx Cardiac Disorders: No


Hx Congestive Heart Failure: No


Hx Hypertension: No


Hx Hypercholesterolemia: No


Hx Pacemaker: No


HX Cerebrovascular Accident: No


Hx Seizures: No


Hx Dementia: No


Hx Diabetes: Yes


Hx Gastrointestinal Disorders: Yes


Hx Liver Disease: Yes (Hep C, treated)


Hx Genitourinary Disorders: No


Hx Sexually Transmitted Disorders: Yes


Hx Renal Disease (ESRD): No


Hx Thyroid Disease: No


Hx Human Immunodeficiency Virus (HIV): Yes (1984 on meds)


Hx Hepatitis C: Yes (treated )


Hx Depression: No


Hx Suicide Attempt: No


Hx Bipolar Disorder: No


Hx Schizophrenia: No





- Patient Surgical History


Past Surgical History: No





- PPD History


Previous Implant?: Yes


Documented Results: Negative w/proof


Implanted On Prior R Admission?: Yes


Date: 19


Results: 0 mm


PPD to be Administered?: No





- Smoking Cessation


Smoking history: Current every day smoker


Have you smoked in the past 12 months: Yes


Aproximately how many cigarettes per day: 5


Hx Chewing Tobacco Use: No


Initiated information on smoking cessation: Yes


'Breaking Loose' booklet given: 19





- Substances abused


  ** Heroin


Substance route: Injection


Frequency: Daily


Amount used: $150/day


Age of first use: 18


Date of last use: 19





  ** Cocaine


Substance route: Injection


Frequency: Daily


Amount used: $100/day


Age of first use: 18


Date of last use: 19





  ** Alcohol


Substance route: Oral


Frequency: Daily


Amount used: 1 pint of Gin /day


Age of first use: 14


Date of last use: 19





Family Disease History





- Family Disease History


Family Disease History: Diabetes: Mother





Admission Physical Exam [facility]





- Vital Signs


Vital Signs: 


 Vital Signs - 24 hr











  19





  10:25


 


Temperature 98.0 F


 


Pulse Rate 79


 


Respiratory 16





Rate 


 


Blood Pressure 125/64














- Physical


General Appearance: Yes: No Apparent Distress, Cachetic, Thin


HEENTM: Yes: EOMI, Hearing grossly Normal, Normocephalic, Muffled/Hoarse Voice, 

Thrush, Other (poor dentition)


Respiratory: Yes: Chest Non-Tender, Lungs Clear, Normal Breath Sounds, No 

Respiratory Distress, No Accessory Muscle Use


Neck: Yes: No masses,lesions,Nodules, Supple


Breast: Yes: Breast Exam Deferred


Cardiology: Yes: Regular Rhythm, Regular Rate, S1, S2


Abdominal: Yes: Normal Bowel Sounds, Non Tender, Flat, Soft


Genitourinary: Yes: Within Normal Limits


Back: Yes: Normal Inspection


Musculoskeletal: Yes: Other (bilateral LE swelling)


Extremities: Yes: Tremors, Other (unsteady gait)


Neurological: Yes: CNs II-XII NML intact, Fully Oriented, Alert, Normal Mood/

Affect, Normal Response


Integumentary: Yes: Rash, Pitting Edema, Other (lesions from picking)


Lymphatic: Yes: Within Normal Limits





- Diagnostic


(1) Alcohol dependence with withdrawal


Current Visit: Yes   Status: Acute   


Qualifiers: 


   Complication of substance-induced condition: uncomplicated   Qualified Code(s

): F10.230 - Alcohol dependence with withdrawal, uncomplicated   





(2) Opioid dependence with withdrawal


Current Visit: Yes   Status: Acute   





(3) Sedative, hypnotic or anxiolytic dependence with withdrawal, uncomplicated


Current Visit: Yes   Status: Acute   





(4) Anemia


Current Visit: No   Status: Chronic   


Qualifiers: 


   Anemia type: iron deficiency 





(5) DM2 (diabetes mellitus, type 2)


Current Visit: No   Status: Chronic   


Qualifiers: 


   Diabetes mellitus long term insulin use: without long term use 





Cleared for Admission [facility]





- Detox or Rehab


[facility] Level of Care: Medically Managed


Detox Regimen/Protocol: Methadone/Librium


Claeared for Rehab Admission: No





Breathalyzer





- Breathalyzer


Breathalyzer: 0





Urine Drug Screen





- Test Device


Lot number: [id number]


Expiration date: 21





- Control


Is test valid?: Yes





- Results


Drug screen NEGATIVE: No


Urine drug screen results: BENJAMIN-Cocaine, MET-Methamphetamine, FEN-Fentanyl, MOP-

Opiates, OXY-Oxycodone





Inpatient Rehab Admission





- Rehab Decision to Admit


Inpatient rehab admission?: No

## 2019-09-08 LAB
ALBUMIN SERPL-MCNC: 2.3 G/DL (ref 3.4–5)
ALP SERPL-CCNC: 183 U/L (ref 45–117)
ALT SERPL-CCNC: 126 U/L (ref 13–61)
ANION GAP SERPL CALC-SCNC: 4 MMOL/L (ref 8–16)
AST SERPL-CCNC: 147 U/L (ref 15–37)
BILIRUB SERPL-MCNC: 0.4 MG/DL (ref 0.2–1)
BUN SERPL-MCNC: 8.2 MG/DL (ref 7–18)
CALCIUM SERPL-MCNC: 8.2 MG/DL (ref 8.5–10.1)
CHLORIDE SERPL-SCNC: 102 MMOL/L (ref 98–107)
CO2 SERPL-SCNC: 32 MMOL/L (ref 21–32)
CREAT SERPL-MCNC: 0.5 MG/DL (ref 0.55–1.3)
DEPRECATED RDW RBC AUTO: 18.5 % (ref 11.9–15.9)
GLUCOSE SERPL-MCNC: 80 MG/DL (ref 74–106)
HCT VFR BLD CALC: 30.8 % (ref 35.4–49)
HGB BLD-MCNC: 9.9 GM/DL (ref 11.7–16.9)
MCH RBC QN AUTO: 26.8 PG (ref 25.7–33.7)
MCHC RBC AUTO-ENTMCNC: 32.1 G/DL (ref 32–35.9)
MCV RBC: 83.5 FL (ref 80–96)
PLATELET # BLD AUTO: 203 K/MM3 (ref 134–434)
PMV BLD: 10.7 FL (ref 7.5–11.1)
POTASSIUM SERPLBLD-SCNC: 4 MMOL/L (ref 3.5–5.1)
PROT SERPL-MCNC: 8.4 G/DL (ref 6.4–8.2)
RBC # BLD AUTO: 3.69 M/MM3 (ref 4–5.6)
SODIUM SERPL-SCNC: 138 MMOL/L (ref 136–145)
WBC # BLD AUTO: 2.5 K/MM3 (ref 4–10)

## 2019-09-08 RX ADMIN — Medication PRN MG: at 22:45

## 2019-09-08 RX ADMIN — RALTEGRAVIR SCH MG: 400 TABLET, FILM COATED ORAL at 10:04

## 2019-09-08 RX ADMIN — NYSTATIN SCH UNITS: 100000 SUSPENSION ORAL at 12:20

## 2019-09-08 RX ADMIN — NICOTINE SCH MG: 7 PATCH TRANSDERMAL at 10:04

## 2019-09-08 RX ADMIN — EMTRICITABINE AND TENOFOVIR DISOPROXIL FUMARATE SCH TAB: 200; 300 TABLET, FILM COATED ORAL at 10:04

## 2019-09-08 RX ADMIN — NYSTATIN SCH UNITS: 100000 SUSPENSION ORAL at 05:51

## 2019-09-08 RX ADMIN — SULFAMETHOXAZOLE AND TRIMETHOPRIM SCH EACH: 800; 160 TABLET ORAL at 10:03

## 2019-09-08 RX ADMIN — Medication SCH TAB: at 10:03

## 2019-09-08 RX ADMIN — Medication SCH MG: at 22:44

## 2019-09-08 RX ADMIN — RALTEGRAVIR SCH MG: 400 TABLET, FILM COATED ORAL at 22:44

## 2019-09-08 RX ADMIN — NYSTATIN SCH UNITS: 100000 SUSPENSION ORAL at 18:32

## 2019-09-08 NOTE — PN
S CIWA





- CIWA Score


Nausea/Vomitin-Mild Nausea/No Vomiting


Muscle Tremors: 3


Anxiety: 1-Mildly Anxious


Agitation: 3


Paroxysmal Sweats: 2


Orientation: 0-Oriented


Tacttile Disturbances: 1-Very Mild Itch/Numbness


Auditory Disturbances: 0-None


Visual Disturbances: 0-None


Headache: 2-Mild


CIWA-Ar Total Score: 13





[facility] COWS





- Scale


Resting Pulse: 0= [place] 80 or Below


Sweatin= Chills/Flushing


Restless Observation: 0= Sits Still


Pupil Size: 0= Normal to Room Light


Bone or Joint Aches: 1= Mild Discomfort


Runny Nose/ Eye Tearin= Nasal Congestion


GI Upset > 30mins: 2= Nausea/Diarrhea


Tremor Observation of Outstretched Hands: 2= Slight Tremor Visible


Yawning Observation: 1= 1-2x During Session


Anxiety or Irritability: 2=Irritable/Anxious


Goose Flesh Skin: 3=Piloerection


COWS Score: 13





[facility] Progress Note (SOAP)


Subjective: 





59 years old male multiple patient [facility] admission 


was admitted on 19 for alcohol and opiate withdrawal sx management


doing well with libirum and methadone detox regimen


tolerate food and fluid well


ambulating with walker steady gait 





small loose stool x 1 today 


encourage oral fluid


Objective: 





19 13:30


 Vital Signs











Temperature  97.8 F   19 09:52


 


Pulse Rate  73   19 09:52


 


Respiratory Rate  18   19 09:52


 


Blood Pressure  101/57 L  19 09:52


 


O2 Sat by Pulse Oximetry (%)      








 Laboratory Last Values











WBC  2.5 K/mm3 (4.0-10.0)  L  19  08:00    


 


RBC  3.69 M/mm3 (4.00-5.60)  L  19  08:00    


 


Hgb  9.9 GM/dL (11.7-16.9)  L  19  08:00    


 


Hct  30.8 % (35.4-49)  L  19  08:00    


 


MCV  83.5 fl (80-96)   19  08:00    


 


MCH  26.8 pg (25.7-33.7)   19  08:00    


 


MCHC  32.1 g/dl (32.0-35.9)   19  08:00    


 


RDW  18.5 % (11.9-15.9)  H  19  08:00    


 


Plt Count  203 K/MM3 (134-434)   19  08:00    


 


MPV  10.7 fl (7.5-11.1)   19  08:00    


 


Sodium  138 mmol/L (136-145)   19  08:00    


 


Potassium  4.0 mmol/L (3.5-5.1)   19  08:00    


 


Chloride  102 mmol/L ()   19  08:00    


 


Carbon Dioxide  32 mmol/L (21-32)   19  08:00    


 


Anion Gap  4 MMOL/L (8-16)  L  19  08:00    


 


BUN  8.2 mg/dL (7-18)   19  08:00    


 


Creatinine  0.5 mg/dL (0.55-1.3)  L  19  08:00    


 


Est GFR (CKD-EPI)AfAm  138.44   19  08:00    


 


Est GFR (CKD-EPI)NonAf  119.45   19  08:00    


 


Random Glucose  80 mg/dL ()   19  08:00    


 


Calcium  8.2 mg/dL (8.5-10.1)  L  19  08:00    


 


Total Bilirubin  0.4 mg/dL (0.2-1)   19  08:00    


 


AST  147 U/L (15-37)  H  19  08:00    


 


ALT  126 U/L (13-61)  H  19  08:00    


 


Alkaline Phosphatase  183 U/L ()  H  19  08:00    


 


Total Protein  8.4 g/dl (6.4-8.2)  H  19  08:00    


 


Albumin  2.3 g/dl (3.4-5.0)  L  19  08:00    


 


RPR Titer  Nonreactive  (NONREACTIVE)   19  08:00    








lab noted


ast elevevation


patient agrees to consider ativan detox regimen as alternative


repeat ast





Assessment: 





19 13:34


alcohol and opiate withdrawal sx


Plan: 





continue librium and methadone detox regimen

## 2019-09-08 NOTE — EKG
Test Reason : 

Blood Pressure : ***/*** mmHG

Vent. Rate : 062 BPM     Atrial Rate : 062 BPM

   P-R Int : 170 ms          QRS Dur : 084 ms

    QT Int : 424 ms       P-R-T Axes : 080 -04 044 degrees

   QTc Int : 430 ms

 

NORMAL SINUS RHYTHM

LOW VOLTAGE QRS

BORDERLINE ECG

WHEN COMPARED WITH ECG OF 25-MAR-2019 21:06,

NO SIGNIFICANT CHANGE WAS FOUND

Confirmed by AKSHAT JARA MD ([id number]) on 9/8/2019 5:16:05 PM

 

Referred By:             Confirmed By:AKHSAT JARA MD

## 2019-09-09 RX ADMIN — RALTEGRAVIR SCH MG: 400 TABLET, FILM COATED ORAL at 10:27

## 2019-09-09 RX ADMIN — NYSTATIN SCH UNITS: 100000 SUSPENSION ORAL at 23:02

## 2019-09-09 RX ADMIN — NYSTATIN SCH UNITS: 100000 SUSPENSION ORAL at 12:52

## 2019-09-09 RX ADMIN — NYSTATIN SCH UNITS: 100000 SUSPENSION ORAL at 05:30

## 2019-09-09 RX ADMIN — NYSTATIN SCH UNITS: 100000 SUSPENSION ORAL at 00:40

## 2019-09-09 RX ADMIN — NICOTINE SCH: 7 PATCH TRANSDERMAL at 10:28

## 2019-09-09 RX ADMIN — SULFAMETHOXAZOLE AND TRIMETHOPRIM SCH EACH: 800; 160 TABLET ORAL at 10:27

## 2019-09-09 RX ADMIN — Medication SCH TAB: at 10:26

## 2019-09-09 RX ADMIN — RALTEGRAVIR SCH MG: 400 TABLET, FILM COATED ORAL at 22:24

## 2019-09-09 RX ADMIN — EMTRICITABINE AND TENOFOVIR DISOPROXIL FUMARATE SCH TAB: 200; 300 TABLET, FILM COATED ORAL at 10:27

## 2019-09-09 RX ADMIN — Medication SCH MG: at 22:24

## 2019-09-09 RX ADMIN — Medication PRN MG: at 22:24

## 2019-09-09 RX ADMIN — NYSTATIN SCH UNITS: 100000 SUSPENSION ORAL at 18:13

## 2019-09-09 NOTE — PN
[facility] CIWA





- CIWA Score


Nausea/Vomitin-Mild Nausea/No Vomiting


Muscle Tremors: 3


Anxiety: 3


Agitation: 1-Slight > Activity


Paroxysmal Sweats: 2


Orientation: 0-Oriented


Tacttile Disturbances: 0-None


Auditory Disturbances: 0-None


Visual Disturbances: 0-None


Headache: 0-None Present


CIWA-Ar Total Score: 10





[facility] COWS





- Scale


Resting Pulse: 1= [place] 


Sweatin= Chills/Flushing


Restless Observation: 0= Sits Still


Pupil Size: 0= Normal to Room Light


Bone or Joint Aches: 1= Mild Discomfort


Runny Nose/ Eye Tearin= Nasal Congestion


GI Upset > 30mins: 2= Nausea/Diarrhea (no diarrhea)


Tremor Observation of Outstretched Hands: 2= Slight Tremor Visible


Yawning Observation: 1= 1-2x During Session


Anxiety or Irritability: 1=Feels Anxious/Irritable


Goose Flesh Skin: 0=Smooth Skin


COWS Score: 10





BHS Progress Note (SOAP)


Subjective: 





doing well with libirum and methadone detox regimen


ambulating with walker 


denies dizziness no shortness of breath 


discuss aftercare with staff prefers mike atc


Objective: 





19 10:06


 Vital Signs











Temperature  97.1 F L  19 09:30


 


Pulse Rate  84   19 09:30


 


Respiratory Rate  18   19 09:30


 


Blood Pressure  108/74   19 09:30


 


O2 Sat by Pulse Oximetry (%)      








 Laboratory Last Values











WBC  2.5 K/mm3 (4.0-10.0)  L  19  08:00    


 


RBC  3.69 M/mm3 (4.00-5.60)  L  19  08:00    


 


Hgb  9.9 GM/dL (11.7-16.9)  L  19  08:00    


 


Hct  30.8 % (35.4-49)  L  19  08:00    


 


MCV  83.5 fl (80-96)   19  08:00    


 


MCH  26.8 pg (25.7-33.7)   19  08:00    


 


MCHC  32.1 g/dl (32.0-35.9)   19  08:00    


 


RDW  18.5 % (11.9-15.9)  H  19  08:00    


 


Plt Count  203 K/MM3 (134-434)   19  08:00    


 


MPV  10.7 fl (7.5-11.1)   19  08:00    


 


Sodium  138 mmol/L (136-145)   19  08:00    


 


Potassium  4.0 mmol/L (3.5-5.1)   19  08:00    


 


Chloride  102 mmol/L ()   19  08:00    


 


Carbon Dioxide  32 mmol/L (21-32)   19  08:00    


 


Anion Gap  4 MMOL/L (8-16)  L  19  08:00    


 


BUN  8.2 mg/dL (7-18)   19  08:00    


 


Creatinine  0.5 mg/dL (0.55-1.3)  L  19  08:00    


 


Est GFR (CKD-EPI)AfAm  138.44   19  08:00    


 


Est GFR (CKD-EPI)NonAf  119.45   19  08:00    


 


Random Glucose  80 mg/dL ()   19  08:00    


 


Calcium  8.2 mg/dL (8.5-10.1)  L  19  08:00    


 


Total Bilirubin  0.4 mg/dL (0.2-1)   19  08:00    


 


AST  147 U/L (15-37)  H  19  08:00    


 


ALT  126 U/L (13-61)  H  19  08:00    


 


Alkaline Phosphatase  183 U/L ()  H  19  08:00    


 


Total Protein  8.4 g/dl (6.4-8.2)  H  19  08:00    


 


Albumin  2.3 g/dl (3.4-5.0)  L  19  08:00    


 


RPR Titer  Nonreactive  (NONREACTIVE)   19  08:00    











19 10:07


ast repeat


Assessment: 





19 10:07


alcohol and opiate withdrawal sx


Plan: 





patient continue preferring librium detox regimen with methadone

## 2019-09-10 RX ADMIN — RALTEGRAVIR SCH MG: 400 TABLET, FILM COATED ORAL at 10:21

## 2019-09-10 RX ADMIN — EMTRICITABINE AND TENOFOVIR DISOPROXIL FUMARATE SCH TAB: 200; 300 TABLET, FILM COATED ORAL at 10:22

## 2019-09-10 RX ADMIN — Medication PRN MG: at 22:21

## 2019-09-10 RX ADMIN — SULFAMETHOXAZOLE AND TRIMETHOPRIM SCH EACH: 800; 160 TABLET ORAL at 10:21

## 2019-09-10 RX ADMIN — NYSTATIN SCH UNITS: 100000 SUSPENSION ORAL at 07:19

## 2019-09-10 RX ADMIN — Medication SCH TAB: at 10:21

## 2019-09-10 RX ADMIN — NICOTINE SCH: 7 PATCH TRANSDERMAL at 10:22

## 2019-09-10 RX ADMIN — NYSTATIN SCH UNITS: 100000 SUSPENSION ORAL at 13:18

## 2019-09-10 RX ADMIN — Medication SCH MG: at 22:20

## 2019-09-10 RX ADMIN — RALTEGRAVIR SCH MG: 400 TABLET, FILM COATED ORAL at 22:20

## 2019-09-10 RX ADMIN — NYSTATIN SCH UNITS: 100000 SUSPENSION ORAL at 17:13

## 2019-09-10 NOTE — PN
S CIWA





- CIWA Score


Nausea/Vomitin-Mild Nausea/No Vomiting


Muscle Tremors: 2


Anxiety: 2


Agitation: 2


Paroxysmal Sweats: No Perspiration


Orientation: 0-Oriented


Tacttile Disturbances: 0-None


Auditory Disturbances: 0-None


Visual Disturbances: 0-None


Headache: 0-None Present


CIWA-Ar Total Score: 7





[facility] COWS





- Scale


Resting Pulse: 0= [place] 80 or Below


Sweatin= Chills/Flushing


Restless Observation: 0= Sits Still


Pupil Size: 0= Normal to Room Light


Bone or Joint Aches: 1= Mild Discomfort


Runny Nose/ Eye Tearin= None


GI Upset > 30mins: 2= Nausea/Diarrhea


Tremor Observation of Outstretched Hands: 1= Tremor Felt, Not Seen


Yawning Observation: 1= 1-2x During Session


Anxiety or Irritability: 1=Feels Anxious/Irritable


Goose Flesh Skin: 0=Smooth Skin


COWS Score: 7





BHS Progress Note (SOAP)


Subjective: 





doing well with librium and methadone detox regimen


understands the consequences of ast elevation related to alcohol drinking


discuss aftercare with staff prefers returning to infectious disease specialist 

for follow


Objective: 





09/10/19 09:46


 Vital Signs











Temperature  98.6 F   09/10/19 06:12


 


Pulse Rate  70   09/10/19 06:12


 


Respiratory Rate  18   09/10/19 06:12


 


Blood Pressure  117/67   09/10/19 06:12


 


O2 Sat by Pulse Oximetry (%)      








 Laboratory Last Values











WBC  2.5 K/mm3 (4.0-10.0)  L  19  08:00    


 


RBC  3.69 M/mm3 (4.00-5.60)  L  19  08:00    


 


Hgb  9.9 GM/dL (11.7-16.9)  L  19  08:00    


 


Hct  30.8 % (35.4-49)  L  19  08:00    


 


MCV  83.5 fl (80-96)   19  08:00    


 


MCH  26.8 pg (25.7-33.7)   19  08:00    


 


MCHC  32.1 g/dl (32.0-35.9)   19  08:00    


 


RDW  18.5 % (11.9-15.9)  H  19  08:00    


 


Plt Count  203 K/MM3 (134-434)   19  08:00    


 


MPV  10.7 fl (7.5-11.1)   19  08:00    


 


Sodium  138 mmol/L (136-145)   19  08:00    


 


Potassium  4.0 mmol/L (3.5-5.1)   19  08:00    


 


Chloride  102 mmol/L ()   19  08:00    


 


Carbon Dioxide  32 mmol/L (21-32)   19  08:00    


 


Anion Gap  4 MMOL/L (8-16)  L  19  08:00    


 


BUN  8.2 mg/dL (7-18)   19  08:00    


 


Creatinine  0.5 mg/dL (0.55-1.3)  L  19  08:00    


 


Est GFR (CKD-EPI)AfAm  138.44   19  08:00    


 


Est GFR (CKD-EPI)NonAf  119.45   19  08:00    


 


Random Glucose  80 mg/dL ()   19  08:00    


 


Calcium  8.2 mg/dL (8.5-10.1)  L  19  08:00    


 


Total Bilirubin  0.4 mg/dL (0.2-1)   19  08:00    


 


AST  147 U/L (15-37)  H  19  08:00    


 


ALT  126 U/L (13-61)  H  19  08:00    


 


Alkaline Phosphatase  183 U/L ()  H  19  08:00    


 


Total Protein  8.4 g/dl (6.4-8.2)  H  19  08:00    


 


Albumin  2.3 g/dl (3.4-5.0)  L  19  08:00    


 


RPR Titer  Nonreactive  (NONREACTIVE)   19  08:00    








lab noted


chornic low wbc with long history of hiv


ast elevation repeat pending


Assessment: 





09/10/19 09:47


alcohol and opiate withdrawal sx


Plan: 





continue librium and methadone detox


staff recommend ativan as alternative for libirum due to ast elevation


patient prefers libirum "works for me"

## 2019-09-10 NOTE — CONSULT
[facility] Psychiatric Consult





- Data


Date of interview: 09/10/19


Admission source: [facility]


Identifying data: This is one of multiple admissions to [facility] for this 59 y/

o  male self-referred for detoxification (cocaine, heroin, xanax, 

alcohol). Examined on [facility] [place]. Patient is , no children, domiciled, 

unemployed (disabled) and supported on SSD benefits.


Substance Abuse History: Confirmed by patient. Details in current [facility] report as 

follows : Smoking history: Current every day smoker.  Have you smoked in the 

past 12 months: Yes.  Aproximately how many cigarettes per day: 5.  Hx Chewing 

Tobacco Use: No.  Initiated information on smoking cessation: Yes.  'Breaking 

Loose' booklet given: 09/07/19.  - Substances abused.  ** Heroin.  Substance 

route: Injection.  Frequency: Daily.  Amount used: $150/day.  Age of first use: 

18.  Date of last use: 09/06/19.  ** Cocaine.  Substance route: Injection.  

Frequency: Daily.  Amount used: $100/day.  Age of first use: 18.  Date of last 

use: 09/06/19.  ** Alcohol.  Substance route: Oral.  Frequency: Daily.  Amount 

used: 1 pint of Gin /day.  Age of first use: 14.  Date of last use: 09/06/19


Medical History: Remarkable for HIV infection since 1984, cachexia, hepatitis B 

+ C and right leg injury (wounded in combat in [facility]). Patient ambulates with a 

walker.


Psychiatric History: Patient sought psychiatric help for the first time in 

1985. He started psychiatric follow-up, after his discharge from the  [employer], at the [facility] where he received the diagnoses of PTSD 

and Anxiety Disorder. Mr George indicates current follow-up at [facility] in the [place] (location not recalled). Patient denies suicide 

attempts.


Physical/Sexual Abuse/Trauma History: Patient denies history of abuse. 

Traumatized by his war experiences during his tours of duty in [facility] and [facility] (

served as a [employer]). Discharged from the  in 1987.


Additional Comment: Urine drug screen results: BENJAMIN-Cocaine, MET-Methamphetamine

, FEN-Fentanyl, MOP-Opiates, OXY-Oxycodone. Noted.





Mental Status Exam





- Mental Status Exam


Alert and Oriented to: Time, Place, Person


Cognitive Function: Grossly Intact


Patient Appearance: Unkempt, Disheveled


Mood: Sad, Nervous, Withdrawn


Affect: Mood Congruent, Constricted


Patient Behavior: Fatigued, Cooperative


Speech Pattern: Clear


Voice Loudness: Normal


Thought Process: Goal Oriented


Thought Disorder: Not Present


Hallucinations: Denies


Suicidal Ideation: Denies


Homicidal Ideation: Denies


Insight/Judgement: Poor


Sleep: Poorly, Difficulty falling asleep


Appetite: Poor, Weight loss


Gait/Station: Other (ambulates with a walker)





Psychiatric Findings





- Problem List (Axis 1, 2,3)


(1) Alcohol dependence with withdrawal


Current Visit: Yes   Status: Acute   


Qualifiers: 


   Complication of substance-induced condition: uncomplicated   Qualified Code(s

): F10.230 - Alcohol dependence with withdrawal, uncomplicated   





(2) Opioid dependence with withdrawal


Current Visit: Yes   Status: Acute   





(3) Cocaine dependence


Current Visit: Yes   Status: Chronic   


Qualifiers: 


   Substance use status: uncomplicated   Qualified Code(s): F14.20 - Cocaine 

dependence, uncomplicated   





(4) Nicotine dependence


Current Visit: Yes   Status: Chronic   


Qualifiers: 


   Nicotine product type: cigarettes   Substance use status: uncomplicated   

Qualified Code(s): F17.210 - Nicotine dependence, cigarettes, uncomplicated   





(5) Substance induced mood disorder


Current Visit: Yes   Status: Chronic   





(6) PTSD (post-traumatic stress disorder)


Current Visit: Yes   Status: Chronic   





(7) Insomnia


Current Visit: Yes   Status: Chronic   





(8) Non-compliance


Current Visit: Yes   Status: Chronic   





- Initial Treatment Plan


Initial Treatment Plan: Psychoeducation. Sleep hygiene. Detoxification. 

Support. AA/NA meetings. Elavil 25 mg po hs. Side effects/benefits are 

discussed with patient. Mr George is agreeable with this plan of care. 

Observation.

## 2019-09-11 VITALS — DIASTOLIC BLOOD PRESSURE: 64 MMHG | TEMPERATURE: 97.7 F | SYSTOLIC BLOOD PRESSURE: 109 MMHG | HEART RATE: 98 BPM

## 2019-09-11 RX ADMIN — SULFAMETHOXAZOLE AND TRIMETHOPRIM SCH EACH: 800; 160 TABLET ORAL at 09:45

## 2019-09-11 RX ADMIN — NICOTINE SCH: 7 PATCH TRANSDERMAL at 09:45

## 2019-09-11 RX ADMIN — NYSTATIN SCH UNITS: 100000 SUSPENSION ORAL at 05:55

## 2019-09-11 RX ADMIN — EMTRICITABINE AND TENOFOVIR DISOPROXIL FUMARATE SCH TAB: 200; 300 TABLET, FILM COATED ORAL at 09:45

## 2019-09-11 RX ADMIN — NYSTATIN SCH UNITS: 100000 SUSPENSION ORAL at 02:32

## 2019-09-11 RX ADMIN — Medication SCH TAB: at 09:44

## 2019-09-11 RX ADMIN — RALTEGRAVIR SCH MG: 400 TABLET, FILM COATED ORAL at 09:45

## 2019-09-11 NOTE — DS
[facility] Detox Discharge Summary


Admission Date: 


19





Discharge Date: 19





- History


Present History: Alcohol Dependence, Opioid Dependence


Additional Comments: 





59 years old male admitted on 19 for alcohol and opiate withdrawal sx 

management


did well with libirum and methadone detox regimen


ambulating with walker steady gait


no complication through out the detox stay


seen by psychiatrist treated with elavil 


patient tolerated well





patient is alert oriented x 3 


cardiac S1S2 regular rate rhythm


respiratory clear lung sound bilaterally on auscultation


abdomen soft no rebound tenderness





- Physical Exam Results


Vital Signs: 


 Vital Signs











Temperature  97.7 F   19 09:14


 


Pulse Rate  98 H  19 09:14


 


Respiratory Rate  18   19 09:14


 


Blood Pressure  109/64   19 09:14


 


O2 Sat by Pulse Oximetry (%)      











Pertinent Admission Physical Exam Findings: 





alcohol and opiate withdrawal sx


 Laboratory Last Values











WBC  2.5 K/mm3 (4.0-10.0)  L  19  08:00    


 


RBC  3.69 M/mm3 (4.00-5.60)  L  19  08:00    


 


Hgb  9.9 GM/dL (11.7-16.9)  L  19  08:00    


 


Hct  30.8 % (35.4-49)  L  19  08:00    


 


MCV  83.5 fl (80-96)   19  08:00    


 


MCH  26.8 pg (25.7-33.7)   19  08:00    


 


MCHC  32.1 g/dl (32.0-35.9)   19  08:00    


 


RDW  18.5 % (11.9-15.9)  H  19  08:00    


 


Plt Count  203 K/MM3 (134-434)   19  08:00    


 


MPV  10.7 fl (7.5-11.1)   19  08:00    


 


Sodium  138 mmol/L (136-145)   19  08:00    


 


Potassium  4.0 mmol/L (3.5-5.1)   19  08:00    


 


Chloride  102 mmol/L ()   19  08:00    


 


Carbon Dioxide  32 mmol/L (21-32)   19  08:00    


 


Anion Gap  4 MMOL/L (8-16)  L  19  08:00    


 


BUN  8.2 mg/dL (7-18)   19  08:00    


 


Creatinine  0.5 mg/dL (0.55-1.3)  L  19  08:00    


 


Est GFR (CKD-EPI)AfAm  138.44   19  08:00    


 


Est GFR (CKD-EPI)NonAf  119.45   19  08:00    


 


Random Glucose  80 mg/dL ()   19  08:00    


 


Calcium  8.2 mg/dL (8.5-10.1)  L  19  08:00    


 


Total Bilirubin  0.4 mg/dL (0.2-1)   19  08:00    


 


AST  143 U/L (15-37)  H  09/10/19  08:00    


 


ALT  126 U/L (13-61)  H  19  08:00    


 


Alkaline Phosphatase  183 U/L ()  H  19  08:00    


 


Total Protein  8.4 g/dl (6.4-8.2)  H  19  08:00    


 


Albumin  2.3 g/dl (3.4-5.0)  L  19  08:00    


 


RPR Titer  Nonreactive  (NONREACTIVE)   19  08:00    














- Treatment


Hospital Course: Detox Protocol Followed, Detoxed Safely, Responded well, 

Discharged Condition Good, Rehab Referral Accepted


Patient has Accepted a Rehab Referral to: [facility]





- Medication


Discharge Medications: 


Ambulatory Orders





Amitriptyline HCl [Elavil -] 50 mg PO HS #30 tablet 19 


Emtricitabine/Tenofovir [Truvada -] 1 tab PO DAILY #14 tablet 19 


Raltegravir Potassium [Isentress] 400 mg PO BID 14 Days #28 tablet 19 


Ritonavir [Norvir -] 100 mg PO DAILY #14 tab 19 


Albuterol Sulfate Inhaler - [Ventolin HFA Inhaler -] 2 puff IH Q4H PRN #1 

inhaler 09/10/19 


Fluconazole [Diflucan -] 100 mg PO DAILY #30 tablet 09/10/19 


Naloxone HCl [Narcan -] 0.4 mg IM PRN PRN #1 vial 09/10/19 


Sulfamethoxazole/Trimethoprim [Bactrim DS -] 1 tab PO DAILY #30 tablet 09/10/19 











- Diagnosis


(1) Alcohol dependence with withdrawal


Current Visit: Yes   Status: Acute   


Qualifiers: 


   Complication of substance-induced condition: uncomplicated   Qualified Code(s

): F10.230 - Alcohol dependence with withdrawal, uncomplicated   





(2) Opioid dependence with withdrawal


Current Visit: Yes   Status: Acute   





(3) Nicotine dependence


Current Visit: Yes   Status: Acute   


Qualifiers: 


   Nicotine product type: cigarettes   Substance use status: in withdrawal   

Qualified Code(s): F17.213 - Nicotine dependence, cigarettes, with withdrawal   





(4) Substance induced mood disorder


Current Visit: Yes   Status: Suspected   





(5) Asthma


Current Visit: Yes   Status: Chronic   


Qualifiers: 


   Asthma severity: mild   Asthma persistence: intermittent   Asthma 

complication type: unspecified   Qualified Code(s): J45.20 - Mild intermittent 

asthma, uncomplicated   





(6) Human immunodeficiency virus (HIV) disease


Current Visit: Yes   Status: Chronic   





(7) Use of cane as ambulatory aid


Current Visit: Yes   Status: Chronic   





(8) Weight loss


Current Visit: Yes   Status: Acute   





- AMA


Did Patient Leave Against Medical Advice: No





CIWA Score





- CIWA Score


Nausea/Vomitin-No Nausea/No Vomiting


Muscle Tremors: 1-None Visible, but Felt


Anxiety: 2


Agitation: 1-Slight > Activity


Paroxysmal Sweats: No Perspiration


Orientation: 0-Oriented


Tacttile Disturbances: 0-None


Auditory Disturbances: 0-None


Visual Disturbances: 0-None


Headache: 0-None Present


CIWA-Ar Total Score: 4





COWS (PN)





- Opiate Withdrawal


Resting Pulse: 1= RI 


Sweatin= Chills/Flushing


Restless Observation: 0= Sits Still


Pupil Size: 0= Normal to Room Light


Bone or Joint Aches: 1= Mild Discomfort


Runny Nose/ Eye Tearin= None


GI Upset > 30mins: 0= None


Tremor Observation of Outstretched Hands: 0= None


Yawning Observation: 0= None


Anxiety or Irritability: 1=Feels Anxious/Irritable


Goose Flesh Skin: 0=Smooth Skin


COWS Score: 4

## 2019-12-04 ENCOUNTER — HOSPITAL ENCOUNTER (INPATIENT)
Dept: HOSPITAL 74 - YASAS | Age: 59
LOS: 5 days | Discharge: OTHER INSTITUTION | DRG: 773 | End: 2019-12-09
Attending: ALLERGY & IMMUNOLOGY | Admitting: ALLERGY & IMMUNOLOGY
Payer: COMMERCIAL

## 2019-12-04 VITALS — BODY MASS INDEX: 21.2 KG/M2

## 2019-12-04 DIAGNOSIS — B20: ICD-10-CM

## 2019-12-04 DIAGNOSIS — B37.0: ICD-10-CM

## 2019-12-04 DIAGNOSIS — R60.0: ICD-10-CM

## 2019-12-04 DIAGNOSIS — F19.24: ICD-10-CM

## 2019-12-04 DIAGNOSIS — R63.4: ICD-10-CM

## 2019-12-04 DIAGNOSIS — F14.20: ICD-10-CM

## 2019-12-04 DIAGNOSIS — F17.213: ICD-10-CM

## 2019-12-04 DIAGNOSIS — F11.23: ICD-10-CM

## 2019-12-04 DIAGNOSIS — F10.230: Primary | ICD-10-CM

## 2019-12-04 DIAGNOSIS — Z91.013: ICD-10-CM

## 2019-12-04 DIAGNOSIS — J45.20: ICD-10-CM

## 2019-12-04 DIAGNOSIS — B50.9: ICD-10-CM

## 2019-12-04 DIAGNOSIS — E83.51: ICD-10-CM

## 2019-12-04 PROCEDURE — HZ2ZZZZ DETOXIFICATION SERVICES FOR SUBSTANCE ABUSE TREATMENT: ICD-10-PCS | Performed by: ALLERGY & IMMUNOLOGY

## 2019-12-04 RX ADMIN — Medication PRN MG: at 22:41

## 2019-12-04 RX ADMIN — Medication SCH MG: at 22:41

## 2019-12-04 NOTE — HP
COWS





- Scale


Resting Pulse: 0= OR 80 or Below


Sweatin= Chills/Flushing


Restless Observation: 0= Sits Still


Pupil Size: 0= Normal to Room Light


Bone or Joint Aches: 1= Mild Discomfort


Runny Nose/ Eye Tearin= Nasal Congestion


GI Upset > 30mins: 0= None


Tremor Observation: 2= Slight Tremor Visible


Yawning Observation: 0= None


Anxiety or Irritability: 0= None


Goose Flesh Skin: 0=Smooth Skin


COWS Score: 5





CIWA Score


Nausea/Vomitin-No Nausea/No Vomiting


Muscle Tremors: 4-Moderate,w/Arms Extend


Anxiety: 1-Mildly Anxious


Agitation: 0-Normal Activity


Paroxysmal Sweats: No Perspiration


Orientation: 0-Oriented


Tacttile Disturbances: 0-None


Auditory Disturbances: 0-None


Visual Disturbances: 0-None


Headache: 0-None Present


CIWA-Ar Total Score: 5





- Admission Criteria


[facility] Guidelines: Admission for Medically Managed Detox: 


Requires at least one of the followin. CIWA greater than 12


2. Seizures within the past 24 hours


3. Delirium tremens within the past 24 hours


4. Hallucinations within the past 24 hours


5. Acute intervention needed for co  occurring medical disorder


6. Acute intervention needed for co  occurring psychiatric disorder


7. Severe withdrawal that cannot be handled at a lower level of care (continued


    vomiting, continued diarrhea, abnormal vital signs) requiring intravenous


    medication and/or fluids


8. Pregnancy








Admitting History and Physical





- Smoking History


Smoking history: Current every day smoker


Have you smoked in the past 12 months: Yes


Aproximately how many cigarettes per day: 5





- Alcohol/Substance Use


Hx Alcohol Use: Yes





Admission [facility]


Allergies/Adverse Reactions: 


 Allergies











Allergy/AdvReac Type Severity Reaction Status Date / Time


 


fish derived Allergy Severe Hives Verified 19 13:53


 


shellfish derived Allergy Severe Hives Verified 19 13:53


 


No Known Drug Allergies Allergy   Verified 19 13:53


 


seafood Allergy Severe Hives Uncoded 19 13:53


 


NKDA Allergy   Uncoded 19 13:53











History of Present Illness: 





60 Y/O M with history of  heroin,cocaine and alcohol dependence presenting to 

[facility] for detox. Multiple prior detox/rehab admissions; last admission 

being in 2019 for detox which he completed successfully. Pt has been 

Injecting heroin for the past 27 years in both his arms and legs. He admits to 

using about 240$ worth daily with last use this morning. No history of 

overdose. He also admits to injecting 2-3 gm of cocaine daily (speedball) for 

the same duration of time. He states that he gets his needles from a program. 

longest sobriety 10 years.


He drinks about 2 bottle of vodka or rum almost everyday with last drink being 

yesterday. NO blackouts or withdrawal seizures.


smokes 1.5 PPD for over 20 yrs. 


plan for rehab after detox


PMH: HIV ( not on meds for a while due to drug use), Asthma, oropharyngeal 

candidiasis,hepatitis c treated


PSYCH HX: none


PSH: none








PE:





VS 99.1F, 124/65 mmHg, 75 mmHg, 18


Utox: miguel ángel, fen, mop


CIWA:5


COWS:5





General; NAF


HEENT: PERRLA, severe oropharyngeal candidiasis, poor dentition


LUNG: VBS b/l


HEART: RRR no MRG


Abdomen:+ BS, mild tender but non distended


extremities: 2+ pulses, edema 1+ on the left (mild erythema but no tenderness/

pain), trace on right


neuro: grossly intact


skin : track marks and excoriations in b/l upper extremities





Plan: 


AUD: ativan protocol (abnormal LFTs)


OUD: methadone severe protocol


Unilateral leg swelling : pt declined going to [facility] to r/o DVT . he firmly 

associate the swelling from his constant walking and injection. 


DVT less likely but could not r/o without further eval.


oropharyngeal candidiasis: fluconazole 200mg daily for 3 days then 100mg daily 

for additional 11 days.  





- Ebola screening


Have you traveled outside of the country in the last 21 days: No


Have you had contact with anyone from an Ebola affected area: No


Do you have a fever: No





Patient History





- Patient Medical History


Hx Anemia: Yes (on iron)


Hx Asthma: Yes


Hx Chronic Obstructive Pulmonary Disease (COPD): No


Hx Cancer: No


Hx Cardiac Disorders: No


Hx Congestive Heart Failure: No


Hx Hypertension: No


Hx Hypercholesterolemia: No


Hx Pacemaker: No


HX Cerebrovascular Accident: No


Hx Seizures: No


Hx Dementia: No


Hx Diabetes: Yes


Hx Gastrointestinal Disorders: Yes


Hx Liver Disease: Yes (Hep C, treated)


Hx Genitourinary Disorders: No


Hx Sexually Transmitted Disorders: Yes


Hx Renal Disease (ESRD): No


Hx Thyroid Disease: No


Hx Human Immunodeficiency Virus (HIV): Yes (1984 on meds)


Hx Hepatitis C: Yes (treated )


Hx Depression: No


Hx Suicide Attempt: No


Hx Bipolar Disorder: No


Hx Schizophrenia: No





- Patient Surgical History


Past Surgical History: No


Hx Neurologic Surgery: No


Hx Cataract Extraction: No


Hx Cardiac Surgery: No


Hx Lung Surgery: No


Hx Breast Surgery: No


Hx Breast Biopsy: No


Hx Abdominal Surgery: No


Hx Appendectomy: No


Hx Cholecystectomy: No


Hx Genitourinary Surgery: No


Hx  Section: No


Hx Orthopedic Surgery: No


Anesthesia Reaction: No





- PPD History


Date: 19


Results: 0 mm





- Smoking Cessation


Smoking history: Current every day smoker


Have you smoked in the past 12 months: Yes


Aproximately how many cigarettes per day: 10


Hx Chewing Tobacco Use: No


Initiated information on smoking cessation: Yes


'Breaking Loose' booklet given: 19





- Substances abused


  ** Heroin


Substance route: Injection


Frequency: Daily


Amount used: $240/day


Age of first use: 18


Date of last use: 19





  ** Cocaine


Substance route: Injection


Frequency: Daily


Amount used: $100/day


Age of first use: 18


Date of last use: 19





  ** Alcohol


Substance route: Oral


Frequency: Daily


Amount used: 1 pint of Gin /day


Age of first use: 14


Date of last use: 19





Admission Physical Exam [facility]





- Vital Signs


Vital Signs: 


 Vital Signs - 24 hr











  19





  13:21


 


Temperature 99.1 F


 


Pulse Rate 75


 


Respiratory 18





Rate 


 


Blood Pressure 124/65














Cleared for Admission [facility]





- Detox or Rehab


[facility] Level of Care: Medically Supervised





Breathalyzer





- Breathalyzer


Breathalyzer: 0





Urine Drug Screen





- Test Device


Lot number: [id number]


Expiration date: 21





- Control


Is test valid?: Yes





- Results


Drug screen NEGATIVE: No


Urine drug screen results: MIGUEL ÁNGEL-Cocaine, FEN-Fentanyl, MOP-Opiates





Inpatient Rehab Admission





- Rehab Decision to Admit


Inpatient rehab admission?: No

## 2019-12-04 NOTE — PN
Teaching Attending Note


Name of Resident: Renay Ahuja





ATTENDING PHYSICIAN STATEMENT





I saw and evaluated the patient.


I reviewed the resident's note and discussed the case with the resident.


I agree with the resident's findings and plan as documented.








SUBJECTIVE: 58 yo with HIV, asthma, HCV treated, here for alcohol and opioid 

use disorders. Was last here about 3 months ago-Pt is currently using large 

amount of cocaine and IV heroin








OBJECTIVE:


 Vital Signs - 24 hr











  12/04/19





  13:21


 


Temperature 99.1 F


 


Pulse Rate 75


 


Respiratory 18





Rate 


 


Blood Pressure 124/65











multiple excoriations of arms


extensive thrush extending to pahrynx


LLE with pitting edema extending from knees to ankle- pt states it is long 

standing-good DP pulse and no calf tenderness


 





ASSESSMENT AND PLAN:





AUD: ativan detox protocol


OUD- methadone detox protocol


HIV- not taking meds for several years, now with probable esophageal candidiasis

- diflucan loading and continued treatment.


Pt agrees to go for longterm Rx with methadone and HIV care in [place].

## 2019-12-05 LAB
ALBUMIN SERPL-MCNC: 2.4 G/DL (ref 3.4–5)
ALP SERPL-CCNC: 114 U/L (ref 45–117)
ALT SERPL-CCNC: 34 U/L (ref 13–61)
ANION GAP SERPL CALC-SCNC: 6 MMOL/L (ref 8–16)
AST SERPL-CCNC: 44 U/L (ref 15–37)
BILIRUB SERPL-MCNC: 0.3 MG/DL (ref 0.2–1)
BUN SERPL-MCNC: 7.6 MG/DL (ref 7–18)
CALCIUM SERPL-MCNC: 7.9 MG/DL (ref 8.5–10.1)
CHLORIDE SERPL-SCNC: 102 MMOL/L (ref 98–107)
CO2 SERPL-SCNC: 28 MMOL/L (ref 21–32)
CREAT SERPL-MCNC: 0.5 MG/DL (ref 0.55–1.3)
DEPRECATED RDW RBC AUTO: 16.6 % (ref 11.9–15.9)
GLUCOSE SERPL-MCNC: 85 MG/DL (ref 74–106)
HCT VFR BLD CALC: 31.6 % (ref 35.4–49)
HGB BLD-MCNC: 10.3 GM/DL (ref 11.7–16.9)
MCH RBC QN AUTO: 26.5 PG (ref 25.7–33.7)
MCHC RBC AUTO-ENTMCNC: 32.4 G/DL (ref 32–35.9)
MCV RBC: 81.7 FL (ref 80–96)
PLATELET # BLD AUTO: 154 K/MM3 (ref 134–434)
PMV BLD: 10.5 FL (ref 7.5–11.1)
POTASSIUM SERPLBLD-SCNC: 3.9 MMOL/L (ref 3.5–5.1)
PROT SERPL-MCNC: 7.9 G/DL (ref 6.4–8.2)
RBC # BLD AUTO: 3.87 M/MM3 (ref 4–5.6)
SODIUM SERPL-SCNC: 136 MMOL/L (ref 136–145)
WBC # BLD AUTO: 2.5 K/MM3 (ref 4–10)

## 2019-12-05 RX ADMIN — NICOTINE SCH: 14 PATCH, EXTENDED RELEASE TRANSDERMAL at 10:14

## 2019-12-05 RX ADMIN — Medication SCH TAB: at 10:14

## 2019-12-05 RX ADMIN — FLUCONAZOLE SCH MG: 100 TABLET ORAL at 13:37

## 2019-12-05 RX ADMIN — CALCIUM CARBONATE-CHOLECALCIFEROL TAB 250 MG-125 UNIT SCH TAB: 250-125 TAB at 22:13

## 2019-12-05 RX ADMIN — Medication SCH MG: at 22:13

## 2019-12-05 RX ADMIN — Medication PRN MG: at 22:13

## 2019-12-05 RX ADMIN — CALCIUM CARBONATE-CHOLECALCIFEROL TAB 250 MG-125 UNIT SCH TAB: 250-125 TAB at 12:54

## 2019-12-05 NOTE — PN
S CIWA





- CIWA Score


Nausea/Vomitin-Mild Nausea/No Vomiting


Muscle Tremors: 3


Anxiety: 3


Agitation: 2


Paroxysmal Sweats: 2


Orientation: 0-Oriented


Tacttile Disturbances: 1-Very Mild Itch/Numbness


Auditory Disturbances: 0-None


Visual Disturbances: 0-None


Headache: 0-None Present


CIWA-Ar Total Score: 12





[facility] COWS





- Scale


Resting Pulse: 0= [place] 80 or Below


Sweatin= Chills/Flushing


Restless Observation: 0= Sits Still


Pupil Size: 1= Pupils >than Normal


Bone or Joint Aches: 0= None


Runny Nose/ Eye Tearin= None


GI Upset > 30mins: 2= Nausea/Diarrhea


Tremor Observation of Outstretched Hands: 1= Tremor Felt, Not Seen


Yawning Observation: 0= None


Anxiety or Irritability: 1=Feels Anxious/Irritable


Goose Flesh Skin: 3=Piloerection


COWS Score: 9





[facility] Progress Note (SOAP)


Subjective: 





59 years old male admitted on 19 for alcohol and opiate withdrawal sx 

management treated with ativan and methadone detox regimen


dietician requests dietary order


regular diet in placed





Objective: 





19 10:40


 Vital Signs











Temperature  97.9 F   19 09:08


 


Pulse Rate  79   19 09:08


 


Respiratory Rate  16   19 09:08


 


Blood Pressure  125/72   19 09:08


 


O2 Sat by Pulse Oximetry (%)      








 Laboratory Last Values











WBC  2.5 K/mm3 (4.0-10.0)  L  19  08:00    


 


RBC  3.87 M/mm3 (4.00-5.60)  L  19  08:00    


 


Hgb  10.3 GM/dL (11.7-16.9)  L  19  08:00    


 


Hct  31.6 % (35.4-49)  L  19  08:00    


 


MCV  81.7 fl (80-96)   19  08:00    


 


MCH  26.5 pg (25.7-33.7)   19  08:00    


 


MCHC  32.4 g/dl (32.0-35.9)   19  08:00    


 


RDW  16.6 % (11.9-15.9)  H  19  08:00    


 


Plt Count  154 K/MM3 (134-434)  D 19  08:00    


 


MPV  10.5 fl (7.5-11.1)   19  08:00    


 


Sodium  136 mmol/L (136-145)   19  08:00    


 


Potassium  3.9 mmol/L (3.5-5.1)   19  08:00    


 


Chloride  102 mmol/L ()   19  08:00    


 


Carbon Dioxide  28 mmol/L (21-32)   19  08:00    


 


Anion Gap  6 MMOL/L (8-16)  L  19  08:00    


 


BUN  7.6 mg/dL (7-18)   19  08:00    


 


Creatinine  0.5 mg/dL (0.55-1.3)  L  19  08:00    


 


Est GFR (CKD-EPI)AfAm  138.44   19  08:00    


 


Est GFR (CKD-EPI)NonAf  119.45   19  08:00    


 


Random Glucose  85 mg/dL ()   19  08:00    


 


Calcium  7.9 mg/dL (8.5-10.1)  L  19  08:00    


 


Total Bilirubin  0.3 mg/dL (0.2-1)   19  08:00    


 


AST  44 U/L (15-37)  H  19  08:00    


 


ALT  34 U/L (13-61)   19  08:00    


 


Alkaline Phosphatase  114 U/L ()   19  08:00    


 


Total Protein  7.9 g/dl (6.4-8.2)   19  08:00    


 


Albumin  2.4 g/dl (3.4-5.0)  L  19  08:00    








lab noted 


long history of hiv treated with ARV and low wbc


low ca++


oscal


Assessment: 





19 10:42


alcohol and opiate withdrawal sx 


19 10:43


medication assisted treatment program


Plan: 





ativan and methadone detox regimen


narcan  from pharmacy

## 2019-12-06 RX ADMIN — CALCIUM CARBONATE-CHOLECALCIFEROL TAB 250 MG-125 UNIT SCH TAB: 250-125 TAB at 22:15

## 2019-12-06 RX ADMIN — FLUCONAZOLE SCH MG: 100 TABLET ORAL at 10:16

## 2019-12-06 RX ADMIN — Medication PRN MG: at 22:15

## 2019-12-06 RX ADMIN — CALCIUM CARBONATE-CHOLECALCIFEROL TAB 250 MG-125 UNIT SCH TAB: 250-125 TAB at 10:15

## 2019-12-06 RX ADMIN — NICOTINE SCH: 14 PATCH, EXTENDED RELEASE TRANSDERMAL at 10:15

## 2019-12-06 RX ADMIN — Medication SCH TAB: at 10:15

## 2019-12-06 RX ADMIN — Medication SCH MG: at 22:15

## 2019-12-06 RX ADMIN — SULFAMETHOXAZOLE AND TRIMETHOPRIM SCH EACH: 800; 160 TABLET ORAL at 13:01

## 2019-12-06 NOTE — PN
S CIWA





- CIWA Score


Nausea/Vomitin-Mild Nausea/No Vomiting


Muscle Tremors: 2


Anxiety: 3


Agitation: 2


Paroxysmal Sweats: 2


Orientation: 1-Uncertain about Date


Tacttile Disturbances: 2-Mild Itch/Numbness/Burn


Auditory Disturbances: 0-None


Visual Disturbances: 1-Very Mild Sensitivity


Headache: 0-None Present


CIWA-Ar Total Score: 14





[facility] COWS





- Scale


Resting Pulse: 0= SC 80 or Below


Sweatin= Chills/Flushing


Restless Observation: 1= Difficult to Sit Still


Pupil Size: 0= Normal to Room Light


Bone or Joint Aches: 1= Mild Discomfort


Runny Nose/ Eye Tearin= Nasal Congestion


GI Upset > 30mins: 2= Nausea/Diarrhea


Tremor Observation of Outstretched Hands: 1= Tremor Felt, Not Seen


Yawning Observation: 2= >3x During Session


Anxiety or Irritability: 2=Irritable/Anxious


Goose Flesh Skin: 0=Smooth Skin


COWS Score: 11





[facility] Progress Note (SOAP)


Subjective: 





Patient is here for alcohol and opioid withdrawal c/o of fatigue, interrupted 

sleep, chills, sweats 


Objective: 





19 10:51


 Vital Signs











Temperature  97.0 F L  19 09:41


 


Pulse Rate  76   19 09:41


 


Respiratory Rate  18   19 09:41


 


Blood Pressure  119/71   19 09:41


 


O2 Sat by Pulse Oximetry (%)      








 Laboratory Last Values











WBC  2.5 K/mm3 (4.0-10.0)  L  19  08:00    


 


RBC  3.87 M/mm3 (4.00-5.60)  L  19  08:00    


 


Hgb  10.3 GM/dL (11.7-16.9)  L  19  08:00    


 


Hct  31.6 % (35.4-49)  L  19  08:00    


 


MCV  81.7 fl (80-96)   19  08:00    


 


MCH  26.5 pg (25.7-33.7)   19  08:00    


 


MCHC  32.4 g/dl (32.0-35.9)   19  08:00    


 


RDW  16.6 % (11.9-15.9)  H  19  08:00    


 


Plt Count  154 K/MM3 (134-434)  D 19  08:00    


 


MPV  10.5 fl (7.5-11.1)   19  08:00    


 


Sodium  136 mmol/L (136-145)   19  08:00    


 


Potassium  3.9 mmol/L (3.5-5.1)   19  08:00    


 


Chloride  102 mmol/L ()   19  08:00    


 


Carbon Dioxide  28 mmol/L (21-32)   19  08:00    


 


Anion Gap  6 MMOL/L (8-16)  L  19  08:00    


 


BUN  7.6 mg/dL (7-18)   19  08:00    


 


Creatinine  0.5 mg/dL (0.55-1.3)  L  19  08:00    


 


Est GFR (CKD-EPI)AfAm  138.44   19  08:00    


 


Est GFR (CKD-EPI)NonAf  119.45   19  08:00    


 


Random Glucose  85 mg/dL ()   19  08:00    


 


Calcium  7.9 mg/dL (8.5-10.1)  L  19  08:00    


 


Total Bilirubin  0.3 mg/dL (0.2-1)   19  08:00    


 


AST  44 U/L (15-37)  H  19  08:00    


 


ALT  34 U/L (13-61)   19  08:00    


 


Alkaline Phosphatase  114 U/L ()   19  08:00    


 


Total Protein  7.9 g/dl (6.4-8.2)   19  08:00    


 


Albumin  2.4 g/dl (3.4-5.0)  L  19  08:00    


 


RPR Titer  Nonreactive  (NONREACTIVE)   19  08:00    





repeat CBC 





19 16:08





Assessment: 





19 16:09


Aox3 no acute distress 


+ bilateral lower lobe wheezing 


+ dry skin multiple healing wounds secondary to scratching 


full ROM ambulating in the unit 





withdrawal sx 


Plan: 





Patient hx of immuno compromised on diflucan and bactrim , noncoherent with ART 

meds , reports has not taken meds in over one month. Patient highly encourage 

to follow up with HIV specialist.


Continue Bactrim and diflucan 


increase PO fluids 


continue to monitor

## 2019-12-07 LAB
ANISOCYTOSIS BLD QL: (no result)
BASOPHILS # BLD: 0.4 % (ref 0–2)
DEPRECATED RDW RBC AUTO: 16.9 % (ref 11.9–15.9)
EOSINOPHIL # BLD: 2 % (ref 0–4.5)
HCT VFR BLD CALC: 32.9 % (ref 35.4–49)
HGB BLD-MCNC: 10.6 GM/DL (ref 11.7–16.9)
LYMPHOCYTES # BLD: 23.9 % (ref 8–40)
MACROCYTES BLD QL: (no result)
MCH RBC QN AUTO: 26.6 PG (ref 25.7–33.7)
MCHC RBC AUTO-ENTMCNC: 32.2 G/DL (ref 32–35.9)
MCV RBC: 82.7 FL (ref 80–96)
MONOCYTES # BLD AUTO: 28.2 % (ref 3.8–10.2)
NEUTROPHILS # BLD: 45.5 % (ref 42.8–82.8)
OVALOCYTES BLD QL SMEAR: (no result)
PLATELET # BLD AUTO: 183 K/MM3 (ref 134–434)
PLATELET BLD QL SMEAR: NORMAL
PMV BLD: 11.9 FL (ref 7.5–11.1)
RBC # BLD AUTO: 3.97 M/MM3 (ref 4–5.6)
TARGETS BLD QL SMEAR: (no result)
TOXIC GRANULES BLD QL SMEAR: (no result)
WBC # BLD AUTO: 2.9 K/MM3 (ref 4–10)

## 2019-12-07 RX ADMIN — Medication PRN MG: at 22:16

## 2019-12-07 RX ADMIN — OXYCODONE HYDROCHLORIDE AND ACETAMINOPHEN SCH MG: 500 TABLET ORAL at 13:40

## 2019-12-07 RX ADMIN — SULFAMETHOXAZOLE AND TRIMETHOPRIM SCH EACH: 800; 160 TABLET ORAL at 10:16

## 2019-12-07 RX ADMIN — FLUCONAZOLE SCH MG: 100 TABLET ORAL at 10:16

## 2019-12-07 RX ADMIN — CALCIUM CARBONATE-CHOLECALCIFEROL TAB 250 MG-125 UNIT SCH TAB: 250-125 TAB at 10:16

## 2019-12-07 RX ADMIN — Medication SCH TAB: at 10:15

## 2019-12-07 RX ADMIN — Medication SCH MG: at 22:16

## 2019-12-07 RX ADMIN — FERROUS SULFATE TAB EC 324 MG (65 MG FE EQUIVALENT) SCH MG: 324 (65 FE) TABLET DELAYED RESPONSE at 17:56

## 2019-12-07 RX ADMIN — NICOTINE SCH MG: 14 PATCH, EXTENDED RELEASE TRANSDERMAL at 10:14

## 2019-12-07 RX ADMIN — CALCIUM CARBONATE-CHOLECALCIFEROL TAB 250 MG-125 UNIT SCH TAB: 250-125 TAB at 22:16

## 2019-12-07 NOTE — PN
[facility] CIWA





- CIWA Score


Nausea/Vomitin-No Nausea/No Vomiting


Muscle Tremors: None


Anxiety: 3


Agitation: 0-Normal Activity


Paroxysmal Sweats: 3


Orientation: 0-Oriented


Tacttile Disturbances: 0-None


Auditory Disturbances: 0-None


Visual Disturbances: 0-None


Headache: 2-Mild


CIWA-Ar Total Score: 8





S COWS





- Scale


Resting Pulse: 0= [place] 80 or Below


Sweating: 3= Beads of Sweat on Face


Restless Observation: 1= Difficult to Sit Still


Pupil Size: 0= Normal to Room Light


Bone or Joint Aches: 1= Mild Discomfort


Runny Nose/ Eye Tearin= None


GI Upset > 30mins: 0= None


Tremor Observation of Outstretched Hands: 0= None


Yawning Observation: 1= 1-2x During Session


Anxiety or Irritability: 2=Irritable/Anxious


Goose Flesh Skin: 0=Smooth Skin


COWS Score: 8





[facility] Progress Note (SOAP)


Subjective: 





c/o mild joint discomfort, sweats, anxiety, and headache.


Objective: 





19 12:34


 Vital Signs











  19





  07:18 09:20


 


Temperature 97.6 F 97.1 F L


 


Pulse Rate 91 H 89


 


Respiratory 18 16





Rate  


 


Blood Pressure 103/66 101/60








 Laboratory Last Values











WBC  2.9 K/mm3 (4.0-10.0)  L  19  07:40    


 


RBC  3.97 M/mm3 (4.00-5.60)  L  19  07:40    


 


Hgb  10.6 GM/dL (11.7-16.9)  L  19  07:40    


 


Hct  32.9 % (35.4-49)  L  19  07:40    


 


MCV  82.7 fl (80-96)   19  07:40    


 


MCH  26.6 pg (25.7-33.7)   19  07:40    


 


MCHC  32.2 g/dl (32.0-35.9)   19  07:40    


 


RDW  16.9 % (11.9-15.9)  H  19  07:40    


 


Plt Count  183 K/MM3 (134-434)   19  07:40    


 


MPV  11.9 fl (7.5-11.1)  H D 19  07:40    


 


Absolute Neuts (auto)  1.3 K/mm3 (1.5-8.0)  L  19  07:40    


 


Neutrophils %  45.5 % (42.8-82.8)  D 19  07:40    


 


Lymphocytes %  23.9 % (8-40)  D 19  07:40    


 


Monocytes %  28.2 % (3.8-10.2)  H D 19  07:40    


 


Eosinophils %  2.0 % (0-4.5)   19  07:40    


 


Basophils %  0.4 % (0-2.0)   19  07:40    


 


Nucleated RBC %  0 % (0-0)   19  07:40    


 


Sodium  136 mmol/L (136-145)   19  08:00    


 


Potassium  3.9 mmol/L (3.5-5.1)   19  08:00    


 


Chloride  102 mmol/L ()   19  08:00    


 


Carbon Dioxide  28 mmol/L (21-32)   19  08:00    


 


Anion Gap  6 MMOL/L (8-16)  L  19  08:00    


 


BUN  7.6 mg/dL (7-18)   19  08:00    


 


Creatinine  0.5 mg/dL (0.55-1.3)  L  19  08:00    


 


Est GFR (CKD-EPI)AfAm  138.44   19  08:00    


 


Est GFR (CKD-EPI)NonAf  119.45   19  08:00    


 


Random Glucose  85 mg/dL ()   19  08:00    


 


Calcium  7.9 mg/dL (8.5-10.1)  L  19  08:00    


 


Total Bilirubin  0.3 mg/dL (0.2-1)   19  08:00    


 


AST  44 U/L (15-37)  H  19  08:00    


 


ALT  34 U/L (13-61)   19  08:00    


 


Alkaline Phosphatase  114 U/L ()   19  08:00    


 


Total Protein  7.9 g/dl (6.4-8.2)   19  08:00    


 


Albumin  2.4 g/dl (3.4-5.0)  L  19  08:00    


 


RPR Titer  Nonreactive  (NONREACTIVE)   19  08:00    








Labs noted with low Hgb/low wbc level.


Assessment: 





19 12:35


AOX3, in no acute respiratory distress.


Full ROM, ambulating in the unit.


Withdrawal symptoms.


Low Hgb


Low wbc


Plan: 





continue detox.


continue antibiotic tx


Ferrous sulfate 325mg po 3times/day.


Ascorbic acid 500mg po daily.

## 2019-12-08 RX ADMIN — CALCIUM CARBONATE-CHOLECALCIFEROL TAB 250 MG-125 UNIT SCH TAB: 250-125 TAB at 22:27

## 2019-12-08 RX ADMIN — SULFAMETHOXAZOLE AND TRIMETHOPRIM SCH EACH: 800; 160 TABLET ORAL at 10:23

## 2019-12-08 RX ADMIN — Medication SCH TAB: at 10:23

## 2019-12-08 RX ADMIN — Medication SCH MG: at 22:27

## 2019-12-08 RX ADMIN — CALCIUM CARBONATE-CHOLECALCIFEROL TAB 250 MG-125 UNIT SCH TAB: 250-125 TAB at 10:23

## 2019-12-08 RX ADMIN — FERROUS SULFATE TAB EC 324 MG (65 MG FE EQUIVALENT) SCH MG: 324 (65 FE) TABLET DELAYED RESPONSE at 18:08

## 2019-12-08 RX ADMIN — NICOTINE SCH MG: 14 PATCH, EXTENDED RELEASE TRANSDERMAL at 10:24

## 2019-12-08 RX ADMIN — FERROUS SULFATE TAB EC 324 MG (65 MG FE EQUIVALENT) SCH MG: 324 (65 FE) TABLET DELAYED RESPONSE at 11:44

## 2019-12-08 RX ADMIN — OXYCODONE HYDROCHLORIDE AND ACETAMINOPHEN SCH MG: 500 TABLET ORAL at 10:22

## 2019-12-08 RX ADMIN — Medication PRN MG: at 22:26

## 2019-12-08 RX ADMIN — FLUCONAZOLE SCH MG: 100 TABLET ORAL at 10:24

## 2019-12-08 RX ADMIN — FERROUS SULFATE TAB EC 324 MG (65 MG FE EQUIVALENT) SCH MG: 324 (65 FE) TABLET DELAYED RESPONSE at 07:53

## 2019-12-08 NOTE — PN
[facility] CIWA





- CIWA Score


Nausea/Vomitin-No Nausea/No Vomiting


Muscle Tremors: 2


Anxiety: 1-Mildly Anxious


Agitation: 0-Normal Activity


Paroxysmal Sweats: 1-Minimal Palms Moist


Orientation: 0-Oriented


Tacttile Disturbances: 0-None


Auditory Disturbances: 0-None


Visual Disturbances: 0-None


Headache: 0-None Present


CIWA-Ar Total Score: 4





[facility]HS COWS





- Scale


Resting Pulse: 1= [place] 


Sweatin= Chills/Flushing


Restless Observation: 0= Sits Still


Pupil Size: 0= Normal to Room Light


Bone or Joint Aches: 0= None


Runny Nose/ Eye Tearin= None


GI Upset > 30mins: 0= None


Tremor Observation of Outstretched Hands: 1= Tremor Felt, Not Seen


Yawning Observation: 0= None


Anxiety or Irritability: 1=Feels Anxious/Irritable


Goose Flesh Skin: 0=Smooth Skin


COWS Score: 4





BHS Progress Note (SOAP)


Subjective: 





59 years old male admitted on 19 for alcohol and opiate withdrawal sx 

management treated with ativan and methadone detox regimen


 feeling better less tremor mild body ache patient prefers to return to his ID 

provider for medical mental behavior and psychosocial therapies





Objective: 





19 10:11


 Vital Signs











Temperature  97.2 F L  19 09:10


 


Pulse Rate  85   19 09:10


 


Respiratory Rate  16   19 09:10


 


Blood Pressure  93/54 L  19 09:10


 


O2 Sat by Pulse Oximetry (%)      








 Laboratory Last Values











WBC  2.9 K/mm3 (4.0-10.0)  L  19  07:40    


 


RBC  3.97 M/mm3 (4.00-5.60)  L  19  07:40    


 


Hgb  10.6 GM/dL (11.7-16.9)  L  19  07:40    


 


Hct  32.9 % (35.4-49)  L  19  07:40    


 


MCV  82.7 fl (80-96)   19  07:40    


 


MCH  26.6 pg (25.7-33.7)   19  07:40    


 


MCHC  32.2 g/dl (32.0-35.9)   19  07:40    


 


RDW  16.9 % (11.9-15.9)  H  19  07:40    


 


Plt Count  183 K/MM3 (134-434)   19  07:40    


 


MPV  11.9 fl (7.5-11.1)  H D 19  07:40    


 


Absolute Neuts (auto)  1.3 K/mm3 (1.5-8.0)  L  19  07:40    


 


Neutrophils %  45.5 % (42.8-82.8)  D 19  07:40    


 


Neutrophils % (Manual)  57.4 % (42.8-82.8)  D 19  07:40    


 


Band Neutrophils %  0.0 %  19  07:40    


 


Lymphocytes %  23.9 % (8-40)  D 19  07:40    


 


Lymphocytes % (Manual)  7.9 % (8-40)  L D 19  07:40    


 


Monocytes %  28.2 % (3.8-10.2)  H D 19  07:40    


 


Monocytes % (Manual)  26 % (3.8-10.2)  H D 19  07:40    


 


Eosinophils %  2.0 % (0-4.5)   19  07:40    


 


Eosinophils % (Manual)  2.0 % (0-4.5)   19  07:40    


 


Basophils %  0.4 % (0-2.0)   19  07:40    


 


Basophils % (Manual)  1.0 % (0-2.0)  D 19  07:40    


 


Myelocytes % (Man)  0 % (0-2)   19  07:40    


 


Promyelocytes % (Man)  0 % (0-2)   19  07:40    


 


Blast Cells % (Manual)  0 % (0-0)  D 19  07:40    


 


Nucleated RBC %  0 % (0-0)   19  07:40    


 


Metamyelocytes  0 % (0-2)   19  07:40    


 


Hypochromia  0   19  07:40    


 


Toxic Granulation  1+   19  07:40    


 


Platelet Estimate  Normal   19  07:40    


 


Platelet Comment  Present   19  07:40    


 


Polychromasia  0   19  07:40    


 


Poikilocytosis  1+   19  07:40    


 


Anisocytosis  1+   19  07:40    


 


Microcytosis  1+   19  07:40    


 


Macrocytosis  1+   19  07:40    


 


Spherocytes  1+   19  07:40    


 


Target Cells  1+   19  07:40    


 


Ovalocytes  1+   19  07:40    


 


Sodium  136 mmol/L (136-145)   19  08:00    


 


Potassium  3.9 mmol/L (3.5-5.1)   19  08:00    


 


Chloride  102 mmol/L ()   19  08:00    


 


Carbon Dioxide  28 mmol/L (21-32)   19  08:00    


 


Anion Gap  6 MMOL/L (8-16)  L  19  08:00    


 


BUN  7.6 mg/dL (7-18)   19  08:00    


 


Creatinine  0.5 mg/dL (0.55-1.3)  L  19  08:00    


 


Est GFR (CKD-EPI)AfAm  138.44   19  08:00    


 


Est GFR (CKD-EPI)NonAf  119.45   19  08:00    


 


Random Glucose  85 mg/dL ()   19  08:00    


 


Calcium  7.9 mg/dL (8.5-10.1)  L  19  08:00    


 


Total Bilirubin  0.3 mg/dL (0.2-1)   19  08:00    


 


AST  44 U/L (15-37)  H  19  08:00    


 


ALT  34 U/L (13-61)   19  08:00    


 


Alkaline Phosphatase  114 U/L ()   19  08:00    


 


Total Protein  7.9 g/dl (6.4-8.2)   19  08:00    


 


Albumin  2.4 g/dl (3.4-5.0)  L  19  08:00    


 


RPR Titer  Nonreactive  (NONREACTIVE)   19  08:00    








lab noted


long history of hiv with low wbc


patient agrees to binging in lab report to ID provider for follow up


Assessment: 





19 10:12


alcohol and opiate withdrawal sx


Plan: 





continue ativan and methadone detox regimen

## 2019-12-09 ENCOUNTER — HOSPITAL ENCOUNTER (INPATIENT)
Dept: HOSPITAL 74 - YASAS | Age: 59
LOS: 14 days | Discharge: HOME | DRG: 772 | End: 2019-12-23
Attending: NEUROMUSCULOSKELETAL MEDICINE & OMM | Admitting: NEUROMUSCULOSKELETAL MEDICINE & OMM
Payer: COMMERCIAL

## 2019-12-09 VITALS — DIASTOLIC BLOOD PRESSURE: 55 MMHG | HEART RATE: 92 BPM | SYSTOLIC BLOOD PRESSURE: 95 MMHG | TEMPERATURE: 97.5 F

## 2019-12-09 DIAGNOSIS — F43.10: ICD-10-CM

## 2019-12-09 DIAGNOSIS — Z99.89: ICD-10-CM

## 2019-12-09 DIAGNOSIS — B20: ICD-10-CM

## 2019-12-09 DIAGNOSIS — Z86.19: ICD-10-CM

## 2019-12-09 DIAGNOSIS — D50.9: ICD-10-CM

## 2019-12-09 DIAGNOSIS — J45.20: ICD-10-CM

## 2019-12-09 DIAGNOSIS — E11.9: ICD-10-CM

## 2019-12-09 DIAGNOSIS — F14.20: ICD-10-CM

## 2019-12-09 DIAGNOSIS — F17.213: ICD-10-CM

## 2019-12-09 DIAGNOSIS — F19.282: ICD-10-CM

## 2019-12-09 DIAGNOSIS — R26.2: ICD-10-CM

## 2019-12-09 DIAGNOSIS — F10.20: ICD-10-CM

## 2019-12-09 DIAGNOSIS — F19.24: ICD-10-CM

## 2019-12-09 DIAGNOSIS — F11.20: Primary | ICD-10-CM

## 2019-12-09 LAB
DEPRECATED RDW RBC AUTO: 17 % (ref 11.9–15.9)
HCT VFR BLD CALC: 28.4 % (ref 35.4–49)
HGB BLD-MCNC: 9.1 GM/DL (ref 11.7–16.9)
MCH RBC QN AUTO: 26.7 PG (ref 25.7–33.7)
MCHC RBC AUTO-ENTMCNC: 32.1 G/DL (ref 32–35.9)
MCV RBC: 83.2 FL (ref 80–96)
PLATELET # BLD AUTO: 163 K/MM3 (ref 134–434)
PMV BLD: 10.6 FL (ref 7.5–11.1)
RBC # BLD AUTO: 3.41 M/MM3 (ref 4–5.6)
WBC # BLD AUTO: 3.6 K/MM3 (ref 4–10)

## 2019-12-09 PROCEDURE — HZ42ZZZ GROUP COUNSELING FOR SUBSTANCE ABUSE TREATMENT, COGNITIVE-BEHAVIORAL: ICD-10-PCS | Performed by: ALLERGY & IMMUNOLOGY

## 2019-12-09 RX ADMIN — FERROUS SULFATE TAB EC 324 MG (65 MG FE EQUIVALENT) SCH MG: 324 (65 FE) TABLET DELAYED RESPONSE at 07:07

## 2019-12-09 RX ADMIN — FLUCONAZOLE SCH MG: 100 TABLET ORAL at 10:28

## 2019-12-09 RX ADMIN — SULFAMETHOXAZOLE AND TRIMETHOPRIM SCH EACH: 800; 160 TABLET ORAL at 10:26

## 2019-12-09 RX ADMIN — Medication PRN MG: at 22:07

## 2019-12-09 RX ADMIN — OXYCODONE HYDROCHLORIDE AND ACETAMINOPHEN SCH MG: 500 TABLET ORAL at 10:29

## 2019-12-09 RX ADMIN — CALCIUM CARBONATE-CHOLECALCIFEROL TAB 250 MG-125 UNIT SCH TAB: 250-125 TAB at 22:07

## 2019-12-09 RX ADMIN — NICOTINE SCH MG: 14 PATCH, EXTENDED RELEASE TRANSDERMAL at 10:27

## 2019-12-09 RX ADMIN — FERROUS SULFATE TAB EC 324 MG (65 MG FE EQUIVALENT) SCH MG: 324 (65 FE) TABLET DELAYED RESPONSE at 12:11

## 2019-12-09 RX ADMIN — Medication SCH MG: at 22:07

## 2019-12-09 RX ADMIN — Medication SCH TAB: at 10:27

## 2019-12-09 RX ADMIN — CALCIUM CARBONATE-CHOLECALCIFEROL TAB 250 MG-125 UNIT SCH TAB: 250-125 TAB at 10:26

## 2019-12-09 NOTE — HP
BHS MD Rehab Assess/Revision





- Admission History


Admitted to Rehab from: [facility]


Date of Admission to Rehab: 12/09/19





- Findings


Detox History & Physical reviewed: Yes


Concur with findings: Yes


Comments/Additional Findings: transferred from detox to rehab admission as per 

protocol





Inpatient Rehab Admission





- Rehab Decision to Admit


Inpatient rehab admission?: Yes





- Initial Determination


Are CD services needed?: Yes


Free of communicable disease: Yes


Not in need of hospitalization: Yes





- Rehab Admission Criteria


Previous failed treatment: Yes


Poor recovery environment: Yes


Comorbidities: Yes


Lacks judgement: Yes


Patient is meeting Inpatient Rehab admission criteria:: Yes

## 2019-12-09 NOTE — DS
[facility]HS Detox Discharge Summary


Admission Date: 


19





Discharge Date: 19





- History


Present History: Alcohol Dependence, Opioid Dependence


Additional Comments: 





59 years old male admitted on 19 for alcohol opiate withdrawal sx 

management


treated with ativan and methdone detox regimen


patient tolerated well alert oriented x 3 


cardiac s1s2 regular rate rhythm


abdomen soft no rebound tenderness


skin warm and dry





- Physical Exam Results


Vital Signs: 


 Vital Signs











Temperature  97.5 F L  19 09:09


 


Pulse Rate  92 H  19 09:09


 


Respiratory Rate  16   19 09:09


 


Blood Pressure  95/55 L  19 09:09


 


O2 Sat by Pulse Oximetry (%)      











Pertinent Admission Physical Exam Findings: 





alcohol opiate withdrawal sx


 Laboratory Last Values











WBC  2.9 K/mm3 (4.0-10.0)  L  19  07:40    


 


RBC  3.97 M/mm3 (4.00-5.60)  L  19  07:40    


 


Hgb  10.6 GM/dL (11.7-16.9)  L  19  07:40    


 


Hct  32.9 % (35.4-49)  L  19  07:40    


 


MCV  82.7 fl (80-96)   19  07:40    


 


MCH  26.6 pg (25.7-33.7)   19  07:40    


 


MCHC  32.2 g/dl (32.0-35.9)   19  07:40    


 


RDW  16.9 % (11.9-15.9)  H  19  07:40    


 


Plt Count  183 K/MM3 (134-434)   19  07:40    


 


MPV  11.9 fl (7.5-11.1)  H D 19  07:40    


 


Absolute Neuts (auto)  1.3 K/mm3 (1.5-8.0)  L  19  07:40    


 


Neutrophils %  45.5 % (42.8-82.8)  D 19  07:40    


 


Neutrophils % (Manual)  57.4 % (42.8-82.8)  D 19  07:40    


 


Band Neutrophils %  0.0 %  19  07:40    


 


Lymphocytes %  23.9 % (8-40)  D 19  07:40    


 


Lymphocytes % (Manual)  7.9 % (8-40)  L D 19  07:40    


 


Monocytes %  28.2 % (3.8-10.2)  H D 19  07:40    


 


Monocytes % (Manual)  26 % (3.8-10.2)  H D 19  07:40    


 


Eosinophils %  2.0 % (0-4.5)   19  07:40    


 


Eosinophils % (Manual)  2.0 % (0-4.5)   19  07:40    


 


Basophils %  0.4 % (0-2.0)   19  07:40    


 


Basophils % (Manual)  1.0 % (0-2.0)  D 19  07:40    


 


Myelocytes % (Man)  0 % (0-2)   19  07:40    


 


Promyelocytes % (Man)  0 % (0-2)   19  07:40    


 


Blast Cells % (Manual)  0 % (0-0)  D 19  07:40    


 


Nucleated RBC %  0 % (0-0)   19  07:40    


 


Metamyelocytes  0 % (0-2)   19  07:40    


 


Hypochromia  0   19  07:40    


 


Toxic Granulation  1+   19  07:40    


 


Platelet Estimate  Normal   19  07:40    


 


Platelet Comment  Present   19  07:40    


 


Polychromasia  0   19  07:40    


 


Poikilocytosis  1+   19  07:40    


 


Anisocytosis  1+   19  07:40    


 


Microcytosis  1+   19  07:40    


 


Macrocytosis  1+   19  07:40    


 


Spherocytes  1+   19  07:40    


 


Target Cells  1+   19  07:40    


 


Ovalocytes  1+   19  07:40    


 


Sodium  136 mmol/L (136-145)   19  08:00    


 


Potassium  3.9 mmol/L (3.5-5.1)   19  08:00    


 


Chloride  102 mmol/L ()   19  08:00    


 


Carbon Dioxide  28 mmol/L (21-32)   19  08:00    


 


Anion Gap  6 MMOL/L (8-16)  L  19  08:00    


 


BUN  7.6 mg/dL (7-18)   19  08:00    


 


Creatinine  0.5 mg/dL (0.55-1.3)  L  19  08:00    


 


Est GFR (CKD-EPI)AfAm  138.44   19  08:00    


 


Est GFR (CKD-EPI)NonAf  119.45   19  08:00    


 


Random Glucose  85 mg/dL ()   19  08:00    


 


Calcium  7.9 mg/dL (8.5-10.1)  L  19  08:00    


 


Total Bilirubin  0.3 mg/dL (0.2-1)   19  08:00    


 


AST  44 U/L (15-37)  H  19  08:00    


 


ALT  34 U/L (13-61)   19  08:00    


 


Alkaline Phosphatase  114 U/L ()   19  08:00    


 


Total Protein  7.9 g/dl (6.4-8.2)   19  08:00    


 


Albumin  2.4 g/dl (3.4-5.0)  L  19  08:00    


 


RPR Titer  Nonreactive  (NONREACTIVE)   19  08:00    








lab noted


long history of hiv with low wbc monitoring by ID specialist





- Treatment


Hospital Course: Detox Protocol Followed, Detoxed Safely, Responded well, 

Discharged Condition Good, Rehab Referral Accepted


Patient [facility]





- Medication


Discharge Medications: 


Ambulatory Orders





Amitriptyline HCl [Elavil -] 50 mg PO HS #30 tablet 19 


Emtricitabine/Tenofovir [Truvada -] 1 tab PO DAILY #14 tablet 19 


Raltegravir Potassium [Isentress] 400 mg PO BID 14 Days #28 tablet 19 


Ritonavir [Norvir -] 100 mg PO DAILY #14 tab 19 


Albuterol Sulfate Inhaler - [Ventolin HFA Inhaler -] 2 puff IH Q4H PRN #1 

inhaler 09/10/19 


Fluconazole [Diflucan -] 100 mg PO DAILY #30 tablet 09/10/19 


Sulfamethoxazole/Trimethoprim [Bactrim DS -] 1 tab PO DAILY #30 tablet 09/10/19 


Naloxone HCl [Narcan] 4 mg NS ASDIR PRN #1 spray 19 


Naloxone HCl [Narcan] 4 mg NS ASDIR PRN #1 spray 19 











- Diagnosis


(1) Human immunodeficiency virus (HIV) disease


Status: Chronic   





(2) Alcohol dependence with withdrawal


Status: Acute   


Qualifiers: 


   Complication of substance-induced condition: uncomplicated   Qualified Code(s

): F10.230 - Alcohol dependence with withdrawal, uncomplicated   





(3) Opioid dependence with withdrawal


Status: Acute   





(4) Weight loss


Status: Acute   





(5) Substance induced mood disorder


Status: Suspected   





(6) Anemia


Status: Chronic   


Qualifiers: 


   Anemia type: iron deficiency 





(7) Nicotine dependence


Status: Acute   


Qualifiers: 


   Nicotine product type: cigarettes   Substance use status: in withdrawal   

Qualified Code(s): F17.213 - Nicotine dependence, cigarettes, with withdrawal   





(8) Asthma


Status: Chronic   


Qualifiers: 


   Asthma severity: mild   Asthma persistence: intermittent   Asthma 

complication type: unspecified   Qualified Code(s): J45.20 - Mild intermittent 

asthma, uncomplicated   





- AMA


Did Patient Leave Against Medical Advice: No





CIWA Score





- CIWA Score


Nausea/Vomitin-No Nausea/No Vomiting


Muscle Tremors: 1-None Visible, but Felt


Anxiety: 0-No Anxiety, at Ease


Agitation: 0-Normal Activity


Paroxysmal Sweats: 1-Minimal Palms Moist


Orientation: 0-Oriented


Tacttile Disturbances: 0-None


Auditory Disturbances: 0-None


Visual Disturbances: 0-None


Headache: 0-None Present


CIWA-Ar Total Score: 2





COWS (PN)





- Opiate Withdrawal


Resting Pulse: 1= [place] 


Sweatin= Chills/Flushing


Restless Observation: 0= Sits Still


Pupil Size: 0= Normal to Room Light


Bone or Joint Aches: 0= None


Runny Nose/ Eye Tearin= None


GI Upset > 30mins: 0= None


Tremor Observation of Outstretched Hands: 0= None


Yawning Observation: 0= None


Anxiety or Irritability: 0= None


Goose Flesh Skin: 0=Smooth Skin


COWS Score: 2

## 2019-12-10 RX ADMIN — OXYCODONE HYDROCHLORIDE AND ACETAMINOPHEN SCH MG: 500 TABLET ORAL at 10:38

## 2019-12-10 RX ADMIN — CALCIUM CARBONATE-CHOLECALCIFEROL TAB 250 MG-125 UNIT SCH: 250-125 TAB at 21:50

## 2019-12-10 RX ADMIN — Medication SCH TAB: at 10:37

## 2019-12-10 RX ADMIN — FLUCONAZOLE SCH MG: 100 TABLET ORAL at 10:37

## 2019-12-10 RX ADMIN — NICOTINE SCH MG: 21 PATCH TRANSDERMAL at 16:01

## 2019-12-10 RX ADMIN — SULFAMETHOXAZOLE AND TRIMETHOPRIM SCH EACH: 800; 160 TABLET ORAL at 10:37

## 2019-12-10 RX ADMIN — Medication SCH: at 21:50

## 2019-12-10 RX ADMIN — CALCIUM CARBONATE-CHOLECALCIFEROL TAB 250 MG-125 UNIT SCH TAB: 250-125 TAB at 10:37

## 2019-12-10 RX ADMIN — Medication PRN MG: at 21:50

## 2019-12-10 RX ADMIN — ALBUTEROL SULFATE PRN PUFF: 90 AEROSOL, METERED RESPIRATORY (INHALATION) at 10:40

## 2019-12-10 NOTE — PN
BHS Progress Note


Note: 





Pt is a 58 y/o male with a hx of cocaine,alcohol and Heroin use disorder 

admitted to rehab from [address] [place] [address].  


PMHx;HIV+(not currently on meds), Asthma,Anemia, Oropharyngeal candida, Hep C


Psych Hx:PTSD





Pt reports he has a primary care provider at [facility], Dr. Richards. Pt reports did not go to his PCP for refill after he ran out due to 

"getting high". States "I'm gonna walk in as soon as i get out to see my doctor

".


 


 


 Vital Signs - 24 hr











  12/10/19 12/10/19 12/10/19





  00:30 03:30 07:55


 


Temperature   97.7 F


 


Pulse Rate   81


 


Respiratory 18 18 18





Rate   


 


Blood Pressure   92/48 L








Alert o x 3


nad


oob ambulating with steady gait





A/P





new rehab pt


s/p detox





Maintain safety


increase po fluids

## 2019-12-11 RX ADMIN — Medication PRN MG: at 21:50

## 2019-12-11 RX ADMIN — Medication SCH TAB: at 10:11

## 2019-12-11 RX ADMIN — Medication SCH: at 21:51

## 2019-12-11 RX ADMIN — NICOTINE SCH MG: 21 PATCH TRANSDERMAL at 10:11

## 2019-12-11 RX ADMIN — CALCIUM CARBONATE-CHOLECALCIFEROL TAB 250 MG-125 UNIT SCH: 250-125 TAB at 21:51

## 2019-12-11 RX ADMIN — FLUCONAZOLE SCH MG: 100 TABLET ORAL at 10:12

## 2019-12-11 RX ADMIN — SULFAMETHOXAZOLE AND TRIMETHOPRIM SCH EACH: 800; 160 TABLET ORAL at 10:11

## 2019-12-11 RX ADMIN — CALCIUM CARBONATE-CHOLECALCIFEROL TAB 250 MG-125 UNIT SCH TAB: 250-125 TAB at 10:12

## 2019-12-11 RX ADMIN — AMITRIPTYLINE HYDROCHLORIDE SCH MG: 100 TABLET, FILM COATED ORAL at 21:50

## 2019-12-11 RX ADMIN — OXYCODONE HYDROCHLORIDE AND ACETAMINOPHEN SCH MG: 500 TABLET ORAL at 10:12

## 2019-12-11 NOTE — CONSULT
[facility] Psychiatric Consult





- Data


Date of interview: 12/11/19


Admission source: [facility]


Identifying data: Mr George is a 59 years old   male, 

unemployed receiving SSD, domiciled seeking detox treatment for alcohol, opioid 

and cocaine


Substance Abuse History: reports history of alcohol, heroin and cocaine use. 

refer to addiction counselor's summary for further information


Medical History: Significant for HIV infection since 1984, anemia, bronchial 

asthma, type 2 diabetes mellitus, history of treatment for hepatitis C and 

right leg injury (wounded in combat in [employer] [facility]). Patient ambulates with a cane. 

Smokes 5 cigarettes daily


Psychiatric History: Patient is known to writer from an encounter earlier this 

year during an admisson to this facility in June 2019. historical narrative 

remains consistent. he reports that his first psychiatric contact was in 1985 

after he was discharged from the . He saw a psychiatrist at the 

[facility] and was diagnosed with PTSD. He was prescribed Elavil which he 

took for 3 years. Reports receiving psychiatric treatment in the early 90's 

while in nursing home. Reports that he was prescribed Elavil again. He served 2 years 

in nursing home and after his release, he went back to the [facility] for outpatient 

psychiatric treatment for one year. Reports that he currently receives 

outpatient psychiatric treatment at one of the [facility] clinic in 

the [place] and he is prescribed Elavil 100 mg/hs.  Denies psychiatric 

hiospitalization or suicidal attempt. At present, reports feeling depressed and 

sleeping poorly


Physical/Sexual Abuse/Trauma History: Patient denies history of abuse. 

Traumatized by his war experiences during his tours of duty in [employer] [facility] and [facility] and [place] (

served as a  ). Discharged from the  in 1987.





Mental Status Exam





- Mental Status Exam


Alert and Oriented to: Time, Place, Person


Cognitive Function: Fair


Patient Appearance: Disheveled


Mood: Depressed


Affect: Appropriate


Patient Behavior: Cooperative


Speech Pattern: Clear


Voice Loudness: Normal


Thought Process: Intact


Thought Disorder: Not Present


Hallucinations: Denies


Suicidal Ideation: Denies


Homicidal Ideation: Denies


Insight/Judgement: Fair


Sleep: Poorly


Appetite: Poor


Muscle strength/Tone: Normal


Gait/Station: Normal





Psychiatric Findings





- Problem List (Axis 1, 2,3)


(1) PTSD (post-traumatic stress disorder)


Current Visit: No   Status: Chronic   





(2) Substance induced mood disorder


Current Visit: No   Status: Acute   





(3) Substance-induced sleep disorder


Current Visit: Yes   Status: Acute   





(4) Alcohol dependence


Current Visit: Yes   Status: Acute   





(5) Opioid dependence


Current Visit: No   Status: Acute   


Qualifiers: 


   Substance use status: in withdrawal   Qualified Code(s): F11.23 - Opioid 

dependence with withdrawal   





(6) Cocaine dependence


Current Visit: No   Status: Acute   


Qualifiers: 


   Substance use status: uncomplicated   Qualified Code(s): F14.20 - Cocaine 

dependence, uncomplicated   





(7) Nicotine dependence


Current Visit: No   Status: Chronic   


Qualifiers: 


   Nicotine product type: cigarettes   Substance use status: in withdrawal   

Qualified Code(s): F17.213 - Nicotine dependence, cigarettes, with withdrawal   





(8) Anemia


Current Visit: No   Status: Chronic   


Qualifiers: 


   Anemia type: iron deficiency 





(9) Asthma


Current Visit: No   Status: Chronic   


Qualifiers: 


   Asthma severity: mild   Asthma persistence: intermittent   Asthma 

complication type: unspecified   Qualified Code(s): J45.20 - Mild intermittent 

asthma, uncomplicated   





(10) Human immunodeficiency virus (HIV) disease


Current Visit: No   Status: Chronic   





(11) Hepatitis C


Current Visit: Yes   Status: Resolved   





- Initial Treatment Plan


Initial Treatment Plan: 1) Continue Elavil 100 mg po HS.  2) Continue inpatient 

rehabilitation

## 2019-12-12 RX ADMIN — SULFAMETHOXAZOLE AND TRIMETHOPRIM SCH EACH: 800; 160 TABLET ORAL at 11:15

## 2019-12-12 RX ADMIN — Medication SCH TAB: at 11:15

## 2019-12-12 RX ADMIN — Medication SCH: at 21:19

## 2019-12-12 RX ADMIN — NICOTINE SCH MG: 21 PATCH TRANSDERMAL at 11:17

## 2019-12-12 RX ADMIN — Medication PRN MG: at 21:19

## 2019-12-12 RX ADMIN — OXYCODONE HYDROCHLORIDE AND ACETAMINOPHEN SCH MG: 500 TABLET ORAL at 11:16

## 2019-12-12 RX ADMIN — CALCIUM CARBONATE-CHOLECALCIFEROL TAB 250 MG-125 UNIT SCH: 250-125 TAB at 21:19

## 2019-12-12 RX ADMIN — AMITRIPTYLINE HYDROCHLORIDE SCH MG: 100 TABLET, FILM COATED ORAL at 21:19

## 2019-12-12 RX ADMIN — CALCIUM CARBONATE-CHOLECALCIFEROL TAB 250 MG-125 UNIT SCH TAB: 250-125 TAB at 11:16

## 2019-12-12 RX ADMIN — FLUCONAZOLE SCH MG: 100 TABLET ORAL at 11:16

## 2019-12-13 RX ADMIN — OXYCODONE HYDROCHLORIDE AND ACETAMINOPHEN SCH MG: 500 TABLET ORAL at 10:48

## 2019-12-13 RX ADMIN — SULFAMETHOXAZOLE AND TRIMETHOPRIM SCH EACH: 800; 160 TABLET ORAL at 10:47

## 2019-12-13 RX ADMIN — NICOTINE SCH MG: 21 PATCH TRANSDERMAL at 10:48

## 2019-12-13 RX ADMIN — FLUCONAZOLE SCH MG: 100 TABLET ORAL at 10:47

## 2019-12-13 RX ADMIN — Medication SCH TAB: at 10:47

## 2019-12-13 RX ADMIN — Medication SCH: at 21:09

## 2019-12-13 RX ADMIN — AMITRIPTYLINE HYDROCHLORIDE SCH MG: 100 TABLET, FILM COATED ORAL at 21:08

## 2019-12-13 RX ADMIN — CALCIUM CARBONATE-CHOLECALCIFEROL TAB 250 MG-125 UNIT SCH: 250-125 TAB at 10:48

## 2019-12-13 RX ADMIN — CALCIUM CARBONATE-CHOLECALCIFEROL TAB 250 MG-125 UNIT SCH: 250-125 TAB at 21:08

## 2019-12-13 RX ADMIN — Medication PRN MG: at 21:09

## 2019-12-14 RX ADMIN — Medication SCH: at 10:16

## 2019-12-14 RX ADMIN — SULFAMETHOXAZOLE AND TRIMETHOPRIM SCH EACH: 800; 160 TABLET ORAL at 10:15

## 2019-12-14 RX ADMIN — AMITRIPTYLINE HYDROCHLORIDE SCH MG: 100 TABLET, FILM COATED ORAL at 22:50

## 2019-12-14 RX ADMIN — Medication SCH MG: at 22:49

## 2019-12-14 RX ADMIN — NICOTINE SCH MG: 21 PATCH TRANSDERMAL at 10:16

## 2019-12-14 RX ADMIN — CALCIUM CARBONATE-CHOLECALCIFEROL TAB 250 MG-125 UNIT SCH: 250-125 TAB at 10:16

## 2019-12-14 RX ADMIN — FLUCONAZOLE SCH MG: 100 TABLET ORAL at 10:15

## 2019-12-14 RX ADMIN — Medication PRN MG: at 22:49

## 2019-12-14 RX ADMIN — CALCIUM CARBONATE-CHOLECALCIFEROL TAB 250 MG-125 UNIT SCH: 250-125 TAB at 22:50

## 2019-12-14 RX ADMIN — OXYCODONE HYDROCHLORIDE AND ACETAMINOPHEN SCH: 500 TABLET ORAL at 10:16

## 2019-12-15 RX ADMIN — Medication SCH: at 10:22

## 2019-12-15 RX ADMIN — NICOTINE SCH MG: 21 PATCH TRANSDERMAL at 10:21

## 2019-12-15 RX ADMIN — Medication PRN MG: at 21:52

## 2019-12-15 RX ADMIN — OXYCODONE HYDROCHLORIDE AND ACETAMINOPHEN SCH: 500 TABLET ORAL at 10:22

## 2019-12-15 RX ADMIN — FLUCONAZOLE SCH MG: 100 TABLET ORAL at 10:21

## 2019-12-15 RX ADMIN — CALCIUM CARBONATE-CHOLECALCIFEROL TAB 250 MG-125 UNIT SCH: 250-125 TAB at 21:53

## 2019-12-15 RX ADMIN — CALCIUM CARBONATE-CHOLECALCIFEROL TAB 250 MG-125 UNIT SCH: 250-125 TAB at 10:21

## 2019-12-15 RX ADMIN — SULFAMETHOXAZOLE AND TRIMETHOPRIM SCH EACH: 800; 160 TABLET ORAL at 10:21

## 2019-12-15 RX ADMIN — Medication SCH: at 21:53

## 2019-12-15 RX ADMIN — AMITRIPTYLINE HYDROCHLORIDE SCH MG: 100 TABLET, FILM COATED ORAL at 21:52

## 2019-12-16 RX ADMIN — CALCIUM CARBONATE-CHOLECALCIFEROL TAB 250 MG-125 UNIT SCH: 250-125 TAB at 21:46

## 2019-12-16 RX ADMIN — Medication SCH: at 10:26

## 2019-12-16 RX ADMIN — SULFAMETHOXAZOLE AND TRIMETHOPRIM SCH EACH: 800; 160 TABLET ORAL at 10:26

## 2019-12-16 RX ADMIN — Medication SCH: at 21:46

## 2019-12-16 RX ADMIN — CALCIUM CARBONATE-CHOLECALCIFEROL TAB 250 MG-125 UNIT SCH: 250-125 TAB at 10:26

## 2019-12-16 RX ADMIN — AMITRIPTYLINE HYDROCHLORIDE SCH MG: 100 TABLET, FILM COATED ORAL at 21:45

## 2019-12-16 RX ADMIN — OXYCODONE HYDROCHLORIDE AND ACETAMINOPHEN SCH: 500 TABLET ORAL at 10:26

## 2019-12-16 RX ADMIN — NICOTINE SCH MG: 21 PATCH TRANSDERMAL at 10:26

## 2019-12-16 RX ADMIN — Medication PRN MG: at 21:45

## 2019-12-16 RX ADMIN — ACETAMINOPHEN PRN MG: 325 TABLET ORAL at 23:21

## 2019-12-16 RX ADMIN — FLUCONAZOLE SCH MG: 100 TABLET ORAL at 10:26

## 2019-12-17 RX ADMIN — CALCIUM CARBONATE-CHOLECALCIFEROL TAB 250 MG-125 UNIT SCH: 250-125 TAB at 10:38

## 2019-12-17 RX ADMIN — OXYCODONE HYDROCHLORIDE AND ACETAMINOPHEN SCH: 500 TABLET ORAL at 10:39

## 2019-12-17 RX ADMIN — ALBUTEROL SULFATE PRN PUFF: 90 AEROSOL, METERED RESPIRATORY (INHALATION) at 10:40

## 2019-12-17 RX ADMIN — Medication SCH: at 21:54

## 2019-12-17 RX ADMIN — Medication SCH TAB: at 10:38

## 2019-12-17 RX ADMIN — Medication PRN MG: at 21:53

## 2019-12-17 RX ADMIN — CALCIUM CARBONATE-CHOLECALCIFEROL TAB 250 MG-125 UNIT SCH: 250-125 TAB at 21:54

## 2019-12-17 RX ADMIN — SULFAMETHOXAZOLE AND TRIMETHOPRIM SCH EACH: 800; 160 TABLET ORAL at 10:38

## 2019-12-17 RX ADMIN — NICOTINE SCH MG: 21 PATCH TRANSDERMAL at 10:38

## 2019-12-17 RX ADMIN — AMITRIPTYLINE HYDROCHLORIDE SCH MG: 100 TABLET, FILM COATED ORAL at 21:53

## 2019-12-18 RX ADMIN — CALCIUM CARBONATE-CHOLECALCIFEROL TAB 250 MG-125 UNIT SCH: 250-125 TAB at 21:52

## 2019-12-18 RX ADMIN — Medication SCH TAB: at 10:48

## 2019-12-18 RX ADMIN — Medication PRN MG: at 21:52

## 2019-12-18 RX ADMIN — ACETAMINOPHEN PRN MG: 325 TABLET ORAL at 23:09

## 2019-12-18 RX ADMIN — OXYCODONE HYDROCHLORIDE AND ACETAMINOPHEN SCH MG: 500 TABLET ORAL at 10:48

## 2019-12-18 RX ADMIN — Medication SCH: at 21:52

## 2019-12-18 RX ADMIN — NICOTINE SCH MG: 21 PATCH TRANSDERMAL at 10:49

## 2019-12-18 RX ADMIN — CALCIUM CARBONATE-CHOLECALCIFEROL TAB 250 MG-125 UNIT SCH: 250-125 TAB at 10:49

## 2019-12-18 RX ADMIN — SULFAMETHOXAZOLE AND TRIMETHOPRIM SCH EACH: 800; 160 TABLET ORAL at 10:48

## 2019-12-18 RX ADMIN — AMITRIPTYLINE HYDROCHLORIDE SCH MG: 100 TABLET, FILM COATED ORAL at 21:51

## 2019-12-19 RX ADMIN — ALUMINUM HYDROXIDE, MAGNESIUM HYDROXIDE, AND SIMETHICONE PRN ML: 200; 200; 20 SUSPENSION ORAL at 15:18

## 2019-12-19 RX ADMIN — CALCIUM CARBONATE-CHOLECALCIFEROL TAB 250 MG-125 UNIT SCH: 250-125 TAB at 21:50

## 2019-12-19 RX ADMIN — NICOTINE SCH MG: 21 PATCH TRANSDERMAL at 10:53

## 2019-12-19 RX ADMIN — Medication SCH: at 21:52

## 2019-12-19 RX ADMIN — FAMOTIDINE SCH MG: 20 TABLET ORAL at 21:51

## 2019-12-19 RX ADMIN — CALCIUM CARBONATE-CHOLECALCIFEROL TAB 250 MG-125 UNIT SCH TAB: 250-125 TAB at 10:53

## 2019-12-19 RX ADMIN — AMITRIPTYLINE HYDROCHLORIDE SCH MG: 100 TABLET, FILM COATED ORAL at 21:50

## 2019-12-19 RX ADMIN — Medication SCH TAB: at 10:53

## 2019-12-19 RX ADMIN — Medication PRN MG: at 21:50

## 2019-12-19 RX ADMIN — ACETAMINOPHEN PRN MG: 325 TABLET ORAL at 21:51

## 2019-12-19 RX ADMIN — SULFAMETHOXAZOLE AND TRIMETHOPRIM SCH EACH: 800; 160 TABLET ORAL at 10:53

## 2019-12-19 RX ADMIN — OXYCODONE HYDROCHLORIDE AND ACETAMINOPHEN SCH MG: 500 TABLET ORAL at 10:53

## 2019-12-20 RX ADMIN — OXYCODONE HYDROCHLORIDE AND ACETAMINOPHEN SCH MG: 500 TABLET ORAL at 10:34

## 2019-12-20 RX ADMIN — FERROUS SULFATE TAB EC 324 MG (65 MG FE EQUIVALENT) SCH MG: 324 (65 FE) TABLET DELAYED RESPONSE at 17:11

## 2019-12-20 RX ADMIN — CALCIUM CARBONATE-CHOLECALCIFEROL TAB 250 MG-125 UNIT SCH: 250-125 TAB at 21:48

## 2019-12-20 RX ADMIN — Medication SCH: at 21:48

## 2019-12-20 RX ADMIN — SULFAMETHOXAZOLE AND TRIMETHOPRIM SCH EACH: 800; 160 TABLET ORAL at 10:34

## 2019-12-20 RX ADMIN — Medication SCH TAB: at 10:33

## 2019-12-20 RX ADMIN — FAMOTIDINE SCH MG: 20 TABLET ORAL at 21:48

## 2019-12-20 RX ADMIN — FAMOTIDINE SCH MG: 20 TABLET ORAL at 10:33

## 2019-12-20 RX ADMIN — ACETAMINOPHEN PRN MG: 325 TABLET ORAL at 21:47

## 2019-12-20 RX ADMIN — CALCIUM CARBONATE-CHOLECALCIFEROL TAB 250 MG-125 UNIT SCH: 250-125 TAB at 10:35

## 2019-12-20 RX ADMIN — Medication PRN MG: at 21:48

## 2019-12-20 RX ADMIN — NICOTINE SCH MG: 21 PATCH TRANSDERMAL at 10:34

## 2019-12-20 RX ADMIN — AMITRIPTYLINE HYDROCHLORIDE SCH MG: 100 TABLET, FILM COATED ORAL at 21:48

## 2019-12-21 RX ADMIN — FERROUS SULFATE TAB EC 324 MG (65 MG FE EQUIVALENT) SCH: 324 (65 FE) TABLET DELAYED RESPONSE at 13:49

## 2019-12-21 RX ADMIN — FERROUS SULFATE TAB EC 324 MG (65 MG FE EQUIVALENT) SCH MG: 324 (65 FE) TABLET DELAYED RESPONSE at 17:59

## 2019-12-21 RX ADMIN — Medication PRN MG: at 21:42

## 2019-12-21 RX ADMIN — CALCIUM CARBONATE-CHOLECALCIFEROL TAB 250 MG-125 UNIT SCH TAB: 250-125 TAB at 10:08

## 2019-12-21 RX ADMIN — Medication SCH: at 21:43

## 2019-12-21 RX ADMIN — ACETAMINOPHEN PRN MG: 325 TABLET ORAL at 22:47

## 2019-12-21 RX ADMIN — NICOTINE SCH MG: 21 PATCH TRANSDERMAL at 10:08

## 2019-12-21 RX ADMIN — FAMOTIDINE SCH: 20 TABLET ORAL at 21:43

## 2019-12-21 RX ADMIN — AMITRIPTYLINE HYDROCHLORIDE SCH MG: 100 TABLET, FILM COATED ORAL at 21:42

## 2019-12-21 RX ADMIN — OXYCODONE HYDROCHLORIDE AND ACETAMINOPHEN SCH MG: 500 TABLET ORAL at 10:08

## 2019-12-21 RX ADMIN — FAMOTIDINE SCH MG: 20 TABLET ORAL at 10:08

## 2019-12-21 RX ADMIN — SULFAMETHOXAZOLE AND TRIMETHOPRIM SCH EACH: 800; 160 TABLET ORAL at 10:08

## 2019-12-21 RX ADMIN — FERROUS SULFATE TAB EC 324 MG (65 MG FE EQUIVALENT) SCH MG: 324 (65 FE) TABLET DELAYED RESPONSE at 10:08

## 2019-12-21 RX ADMIN — CALCIUM CARBONATE-CHOLECALCIFEROL TAB 250 MG-125 UNIT SCH: 250-125 TAB at 21:43

## 2019-12-21 RX ADMIN — Medication SCH TAB: at 10:08

## 2019-12-22 RX ADMIN — FAMOTIDINE SCH: 20 TABLET ORAL at 21:54

## 2019-12-22 RX ADMIN — FERROUS SULFATE TAB EC 324 MG (65 MG FE EQUIVALENT) SCH MG: 324 (65 FE) TABLET DELAYED RESPONSE at 16:45

## 2019-12-22 RX ADMIN — AMITRIPTYLINE HYDROCHLORIDE SCH MG: 100 TABLET, FILM COATED ORAL at 21:52

## 2019-12-22 RX ADMIN — SULFAMETHOXAZOLE AND TRIMETHOPRIM SCH EACH: 800; 160 TABLET ORAL at 10:08

## 2019-12-22 RX ADMIN — Medication PRN MG: at 21:52

## 2019-12-22 RX ADMIN — CALCIUM CARBONATE-CHOLECALCIFEROL TAB 250 MG-125 UNIT SCH: 250-125 TAB at 21:54

## 2019-12-22 RX ADMIN — Medication SCH TAB: at 10:08

## 2019-12-22 RX ADMIN — FERROUS SULFATE TAB EC 324 MG (65 MG FE EQUIVALENT) SCH MG: 324 (65 FE) TABLET DELAYED RESPONSE at 10:08

## 2019-12-22 RX ADMIN — ALBUTEROL SULFATE PRN PUFF: 90 AEROSOL, METERED RESPIRATORY (INHALATION) at 13:53

## 2019-12-22 RX ADMIN — FERROUS SULFATE TAB EC 324 MG (65 MG FE EQUIVALENT) SCH MG: 324 (65 FE) TABLET DELAYED RESPONSE at 11:57

## 2019-12-22 RX ADMIN — CALCIUM CARBONATE-CHOLECALCIFEROL TAB 250 MG-125 UNIT SCH TAB: 250-125 TAB at 10:08

## 2019-12-22 RX ADMIN — ALUMINUM HYDROXIDE, MAGNESIUM HYDROXIDE, AND SIMETHICONE PRN ML: 200; 200; 20 SUSPENSION ORAL at 02:58

## 2019-12-22 RX ADMIN — FAMOTIDINE SCH MG: 20 TABLET ORAL at 10:08

## 2019-12-22 RX ADMIN — Medication SCH: at 21:54

## 2019-12-22 RX ADMIN — ACETAMINOPHEN PRN MG: 325 TABLET ORAL at 21:53

## 2019-12-22 RX ADMIN — NICOTINE SCH MG: 21 PATCH TRANSDERMAL at 10:09

## 2019-12-22 RX ADMIN — OXYCODONE HYDROCHLORIDE AND ACETAMINOPHEN SCH MG: 500 TABLET ORAL at 10:08

## 2019-12-23 VITALS — TEMPERATURE: 97.3 F | SYSTOLIC BLOOD PRESSURE: 123 MMHG | HEART RATE: 88 BPM | DIASTOLIC BLOOD PRESSURE: 71 MMHG

## 2019-12-23 RX ADMIN — CALCIUM CARBONATE-CHOLECALCIFEROL TAB 250 MG-125 UNIT SCH TAB: 250-125 TAB at 09:11

## 2019-12-23 RX ADMIN — Medication SCH TAB: at 09:11

## 2019-12-23 RX ADMIN — FERROUS SULFATE TAB EC 324 MG (65 MG FE EQUIVALENT) SCH MG: 324 (65 FE) TABLET DELAYED RESPONSE at 09:11

## 2019-12-23 RX ADMIN — NICOTINE SCH: 21 PATCH TRANSDERMAL at 09:11

## 2019-12-23 RX ADMIN — FAMOTIDINE SCH: 20 TABLET ORAL at 09:13

## 2019-12-23 RX ADMIN — SULFAMETHOXAZOLE AND TRIMETHOPRIM SCH EACH: 800; 160 TABLET ORAL at 09:11

## 2019-12-23 RX ADMIN — OXYCODONE HYDROCHLORIDE AND ACETAMINOPHEN SCH MG: 500 TABLET ORAL at 09:11

## 2019-12-23 NOTE — DS
[facility] Dis[facility] Summary





- [facility] Discharge Summary


Admission Date: 12/09/19


Discharge Date: 12/23/19





- History


Present History: Alcohol dependence, Cocaine dependence, Opioid dependence





- Discharge Physical Exam


Vital Signs: 


 Vital Signs











Temperature  97.3 F L  12/23/19 07:58


 


Pulse Rate  88   12/23/19 07:58


 


Respiratory Rate  18   12/23/19 07:58


 


Blood Pressure  123/71   12/23/19 07:58


 


O2 Sat by Pulse Oximetry (%)      














- Treatment


Discharge Condition: Discharge condition good


Hospital Course: 





Patient completed rehab today for opiod/alcohol and cocaine dependence. Patient 

attended all group meetings, 1:1 counseling sessions and evaluation by 

psychiatry. Patient states he is motivated to maintain sobriety and is aware of 

his triggers. Patient is medically stable at this time and denies SI/HI.





ROS: denies alcohol, opiod, cocaine cravings, night sweats and anxiety/

restlessness.








PE:





alert and oriented x 3


skin warm and dry


+perrla, eoms intact bl


car s1s2, rrr


resp cta bl


ext full rom, amb ad mary alice





 Laboratory Tests











  12/09/19





  13:10


 


WBC  3.6 L


 


RBC  3.41 L


 


Hgb  9.1 L


 


Hct  28.4 L


 


MCV  83.2


 


MCH  26.7


 


MCHC  32.1


 


RDW  17.0 H


 


Plt Count  163


 


MPV  10.6  D








 Vital Signs











Temperature  97.3 F L  12/23/19 07:58


 


Pulse Rate  88   12/23/19 07:58


 


Respiratory Rate  18   12/23/19 07:58


 


Blood Pressure  123/71   12/23/19 07:58


 


O2 Sat by Pulse Oximetry (%)      








Ambulatory Orders





Amitriptyline HCl [Elavil -] 50 mg PO HS #30 tablet 07/09/19 


Emtricitabine/Tenofovir [Truvada -] 1 tab PO DAILY #14 tablet 07/09/19 


Raltegravir Potassium [Isentress] 400 mg PO BID 14 Days #28 tablet 07/09/19 


Ritonavir [Norvir -] 100 mg PO DAILY #14 tab 07/09/19 


Fluconazole [Diflucan -] 100 mg PO DAILY #30 tablet 09/10/19 


Naloxone HCl [Narcan] 4 mg NS ASDIR PRN #1 spray 12/05/19 


Naloxone HCl [Narcan] 4 mg NS ASDIR PRN #1 spray 12/08/19 


Albuterol Sulfate Inhaler - [Ventolin HFA Inhaler -] 2 puff IH Q4H PRN #1 

inhaler 12/20/19 


Ferrous Sulfate [Feosol] 325 mg PO DAILY #30 ud 12/20/19 


Sulfamethoxazole/Trimethoprim [Bactrim DS -] 1 tab PO DAILY #30 tablet 12/20/19 











- Medication


Discharge Medications: 


Ambulatory Orders





Amitriptyline HCl [Elavil -] 50 mg PO HS #30 tablet 07/09/19 


Emtricitabine/Tenofovir [Truvada -] 1 tab PO DAILY #14 tablet 07/09/19 


Raltegravir Potassium [Isentress] 400 mg PO BID 14 Days #28 tablet 07/09/19 


Ritonavir [Norvir -] 100 mg PO DAILY #14 tab 07/09/19 


Fluconazole [Diflucan -] 100 mg PO DAILY #30 tablet 09/10/19 


Naloxone HCl [Narcan] 4 mg NS ASDIR PRN #1 spray 12/05/19 


Naloxone HCl [Narcan] 4 mg NS ASDIR PRN #1 spray 12/08/19 


Albuterol Sulfate Inhaler - [Ventolin HFA Inhaler -] 2 puff IH Q4H PRN #1 

inhaler 12/20/19 


Ferrous Sulfate [Feosol] 325 mg PO DAILY #30 ud 12/20/19 


Sulfamethoxazole/Trimethoprim [Bactrim DS -] 1 tab PO DAILY #30 tablet 12/20/19 











- Medication-Assisted Treatment (MAT)


Medication-Assisted Treatment (MAT): No


MAT Follow-up Referral: 





Aftercare arranged fro [facility] 12/24/19 at 11am. Medical/Psych care arranged 

for [facility]. 





- Discharge Instructions


Diet, activity, other medical instructions: 





Diet:reg as tolerated


Activity: as tolerated





Other medical instructions:


follow up with PCP as recommended





- Follow-up Referral


Minutes to complete discharge: 30





- AMA


Did Patient Leave Against Medical Advice: No